# Patient Record
Sex: MALE | Race: WHITE | NOT HISPANIC OR LATINO | Employment: FULL TIME | URBAN - METROPOLITAN AREA
[De-identification: names, ages, dates, MRNs, and addresses within clinical notes are randomized per-mention and may not be internally consistent; named-entity substitution may affect disease eponyms.]

---

## 2017-01-17 ENCOUNTER — COMMUNICATION - HEALTHEAST (OUTPATIENT)
Dept: FAMILY MEDICINE | Facility: CLINIC | Age: 52
End: 2017-01-17

## 2017-05-10 ENCOUNTER — COMMUNICATION - HEALTHEAST (OUTPATIENT)
Dept: FAMILY MEDICINE | Facility: CLINIC | Age: 52
End: 2017-05-10

## 2017-05-10 DIAGNOSIS — E03.9 HYPOTHYROIDISM: ICD-10-CM

## 2017-08-18 ENCOUNTER — COMMUNICATION - HEALTHEAST (OUTPATIENT)
Dept: FAMILY MEDICINE | Facility: CLINIC | Age: 52
End: 2017-08-18

## 2017-08-18 DIAGNOSIS — E03.9 HYPOTHYROIDISM: ICD-10-CM

## 2017-10-19 ENCOUNTER — OFFICE VISIT - HEALTHEAST (OUTPATIENT)
Dept: FAMILY MEDICINE | Facility: CLINIC | Age: 52
End: 2017-10-19

## 2017-10-19 DIAGNOSIS — M16.11 OSTEOARTHRITIS OF RIGHT HIP: ICD-10-CM

## 2017-10-19 DIAGNOSIS — Z01.818 PREOPERATIVE EXAMINATION: ICD-10-CM

## 2017-10-19 DIAGNOSIS — E03.9 HYPOTHYROIDISM: ICD-10-CM

## 2017-10-19 ASSESSMENT — MIFFLIN-ST. JEOR: SCORE: 2090.91

## 2017-11-20 ENCOUNTER — COMMUNICATION - HEALTHEAST (OUTPATIENT)
Dept: FAMILY MEDICINE | Facility: CLINIC | Age: 52
End: 2017-11-20

## 2017-11-20 DIAGNOSIS — E03.9 HYPOTHYROIDISM: ICD-10-CM

## 2017-11-30 ENCOUNTER — OFFICE VISIT - HEALTHEAST (OUTPATIENT)
Dept: FAMILY MEDICINE | Facility: CLINIC | Age: 52
End: 2017-11-30

## 2017-11-30 DIAGNOSIS — M16.11 OSTEOARTHRITIS OF RIGHT HIP: ICD-10-CM

## 2017-11-30 DIAGNOSIS — Z01.818 PREOPERATIVE EXAMINATION: ICD-10-CM

## 2017-11-30 ASSESSMENT — MIFFLIN-ST. JEOR: SCORE: 2098.62

## 2017-12-22 ENCOUNTER — RECORDS - HEALTHEAST (OUTPATIENT)
Dept: ADMINISTRATIVE | Facility: OTHER | Age: 52
End: 2017-12-22

## 2018-01-16 ENCOUNTER — RECORDS - HEALTHEAST (OUTPATIENT)
Dept: ADMINISTRATIVE | Facility: OTHER | Age: 53
End: 2018-01-16

## 2018-10-23 ENCOUNTER — OFFICE VISIT - HEALTHEAST (OUTPATIENT)
Dept: FAMILY MEDICINE | Facility: CLINIC | Age: 53
End: 2018-10-23

## 2018-10-23 DIAGNOSIS — R09.89 CHEST CONGESTION: ICD-10-CM

## 2018-10-23 ASSESSMENT — MIFFLIN-ST. JEOR: SCORE: 2101.69

## 2018-11-10 ENCOUNTER — COMMUNICATION - HEALTHEAST (OUTPATIENT)
Dept: FAMILY MEDICINE | Facility: CLINIC | Age: 53
End: 2018-11-10

## 2018-11-10 DIAGNOSIS — E03.9 HYPOTHYROIDISM: ICD-10-CM

## 2019-06-12 ENCOUNTER — COMMUNICATION - HEALTHEAST (OUTPATIENT)
Dept: FAMILY MEDICINE | Facility: CLINIC | Age: 54
End: 2019-06-12

## 2019-06-25 ENCOUNTER — COMMUNICATION - HEALTHEAST (OUTPATIENT)
Dept: FAMILY MEDICINE | Facility: CLINIC | Age: 54
End: 2019-06-25

## 2019-07-22 ENCOUNTER — OFFICE VISIT - HEALTHEAST (OUTPATIENT)
Dept: FAMILY MEDICINE | Facility: CLINIC | Age: 54
End: 2019-07-22

## 2019-07-22 DIAGNOSIS — Z12.11 SCREEN FOR COLON CANCER: ICD-10-CM

## 2019-07-22 DIAGNOSIS — M16.0 PRIMARY OSTEOARTHRITIS OF BOTH HIPS: ICD-10-CM

## 2019-07-22 DIAGNOSIS — Z12.11 SPECIAL SCREENING FOR MALIGNANT NEOPLASMS, COLON: ICD-10-CM

## 2019-07-22 DIAGNOSIS — E03.9 HYPOTHYROIDISM, UNSPECIFIED TYPE: ICD-10-CM

## 2019-07-22 LAB — TSH SERPL DL<=0.005 MIU/L-ACNC: 1.01 UIU/ML (ref 0.3–5)

## 2019-07-22 ASSESSMENT — MIFFLIN-ST. JEOR: SCORE: 2110.47

## 2019-07-23 ENCOUNTER — COMMUNICATION - HEALTHEAST (OUTPATIENT)
Dept: FAMILY MEDICINE | Facility: CLINIC | Age: 54
End: 2019-07-23

## 2019-08-19 ENCOUNTER — COMMUNICATION - HEALTHEAST (OUTPATIENT)
Dept: FAMILY MEDICINE | Facility: CLINIC | Age: 54
End: 2019-08-19

## 2019-08-19 DIAGNOSIS — E03.9 HYPOTHYROIDISM: ICD-10-CM

## 2020-08-14 ENCOUNTER — COMMUNICATION - HEALTHEAST (OUTPATIENT)
Dept: FAMILY MEDICINE | Facility: CLINIC | Age: 55
End: 2020-08-14

## 2020-08-14 DIAGNOSIS — E03.9 HYPOTHYROIDISM: ICD-10-CM

## 2020-11-23 ENCOUNTER — COMMUNICATION - HEALTHEAST (OUTPATIENT)
Dept: FAMILY MEDICINE | Facility: CLINIC | Age: 55
End: 2020-11-23

## 2020-11-23 DIAGNOSIS — E03.9 HYPOTHYROIDISM: ICD-10-CM

## 2020-12-17 ENCOUNTER — OFFICE VISIT - HEALTHEAST (OUTPATIENT)
Dept: FAMILY MEDICINE | Facility: CLINIC | Age: 55
End: 2020-12-17

## 2020-12-17 ENCOUNTER — COMMUNICATION - HEALTHEAST (OUTPATIENT)
Dept: FAMILY MEDICINE | Facility: CLINIC | Age: 55
End: 2020-12-17

## 2020-12-17 DIAGNOSIS — Z12.11 COLON CANCER SCREENING: ICD-10-CM

## 2020-12-17 DIAGNOSIS — E03.9 HYPOTHYROIDISM: ICD-10-CM

## 2020-12-17 DIAGNOSIS — E78.49 OTHER HYPERLIPIDEMIA: ICD-10-CM

## 2020-12-22 ENCOUNTER — COMMUNICATION - HEALTHEAST (OUTPATIENT)
Dept: LAB | Facility: CLINIC | Age: 55
End: 2020-12-22

## 2020-12-23 ENCOUNTER — AMBULATORY - HEALTHEAST (OUTPATIENT)
Dept: LAB | Facility: CLINIC | Age: 55
End: 2020-12-23

## 2020-12-23 ENCOUNTER — AMBULATORY - HEALTHEAST (OUTPATIENT)
Dept: FAMILY MEDICINE | Facility: CLINIC | Age: 55
End: 2020-12-23

## 2020-12-23 DIAGNOSIS — E78.5 HYPERLIPIDEMIA, UNSPECIFIED HYPERLIPIDEMIA TYPE: ICD-10-CM

## 2020-12-23 DIAGNOSIS — E03.9 HYPOTHYROIDISM, UNSPECIFIED TYPE: ICD-10-CM

## 2020-12-23 LAB
ANION GAP SERPL CALCULATED.3IONS-SCNC: 10 MMOL/L (ref 5–18)
BUN SERPL-MCNC: 17 MG/DL (ref 8–22)
CALCIUM SERPL-MCNC: 9.2 MG/DL (ref 8.5–10.5)
CHLORIDE BLD-SCNC: 106 MMOL/L (ref 98–107)
CHOLEST SERPL-MCNC: 222 MG/DL
CO2 SERPL-SCNC: 24 MMOL/L (ref 22–31)
CREAT SERPL-MCNC: 0.95 MG/DL (ref 0.7–1.3)
ERYTHROCYTE [DISTWIDTH] IN BLOOD BY AUTOMATED COUNT: 11 % (ref 11–14.5)
FASTING STATUS PATIENT QL REPORTED: YES
GFR SERPL CREATININE-BSD FRML MDRD: >60 ML/MIN/1.73M2
GLUCOSE BLD-MCNC: 83 MG/DL (ref 70–125)
HCT VFR BLD AUTO: 39 % (ref 40–54)
HDLC SERPL-MCNC: 71 MG/DL
HGB BLD-MCNC: 13.5 G/DL (ref 14–18)
LDLC SERPL CALC-MCNC: 131 MG/DL
MAGNESIUM SERPL-MCNC: 2 MG/DL (ref 1.8–2.6)
MCH RBC QN AUTO: 32.3 PG (ref 27–34)
MCHC RBC AUTO-ENTMCNC: 34.6 G/DL (ref 32–36)
MCV RBC AUTO: 93 FL (ref 80–100)
PLATELET # BLD AUTO: 246 THOU/UL (ref 140–440)
PMV BLD AUTO: 6.5 FL (ref 7–10)
POTASSIUM BLD-SCNC: 4.1 MMOL/L (ref 3.5–5)
RBC # BLD AUTO: 4.17 MILL/UL (ref 4.4–6.2)
SODIUM SERPL-SCNC: 140 MMOL/L (ref 136–145)
TRIGL SERPL-MCNC: 100 MG/DL
TSH SERPL DL<=0.005 MIU/L-ACNC: 0.38 UIU/ML (ref 0.3–5)
WBC: 5.3 THOU/UL (ref 4–11)

## 2021-02-15 ENCOUNTER — RECORDS - HEALTHEAST (OUTPATIENT)
Dept: ADMINISTRATIVE | Facility: OTHER | Age: 56
End: 2021-02-15

## 2021-05-29 NOTE — TELEPHONE ENCOUNTER
Left message for pt to call back to schedule an appt.  Please assist with scheduling upon return call.

## 2021-05-30 NOTE — PROGRESS NOTES
"Assessment/ Plan     1. Hypothyroidism, unspecified type    Recommend checking a thyroid cascade  Continue levothyroxine  Adjust medicine as needed  - Thyroid [place]    2. Screen for colon cancer  4. Special screening for malignant neoplasms, colon    - Ambulatory referral for Colonoscopy  Recommend a colonoscopy as he is due    3. Primary osteoarthritis of both hips  Status post bilateral total hip arthroplasties    Continue to follow-up with orthopedics as needed        Subjective:       Tomasz Kapadia is a 53 y.o. male who presents for a thyroid check.  He has a known history of hypothyroidism as well as bilateral osteoarthritis of both hips.  He has had previous total hip arthroplasties bilaterally and generally tolerated the procedures well.  He has been able to remain physically active.  He continues to take levothyroxine and is due for a thyroid test.    He reports he generally has been feeling well.  Review of systems is negative for headache, dizziness, chest pain, shortness of breath, palpitations, or other specific concerns.  His mood has been stable.  Has not had any recent hospitalizations or significant illness.       The following portions of the patient's history were reviewed and updated as appropriate: allergies, current medications, past family history, past medical history, past social history, past surgical history and problem list. Medications have been reconciled    Review of Systems   A 12 point comprehensive review of systems was negative except as noted.      Current Outpatient Medications   Medication Sig Dispense Refill     levothyroxine (SYNTHROID, LEVOTHROID) 200 MCG tablet Take 1 tablet (200 mcg total) by mouth once Daily at 6:00 am. 90 tablet 2     No current facility-administered medications for this visit.        Objective:      /88   Pulse 88   Temp 98.6  F (37  C) (Oral)   Resp 16   Ht 6' 1\" (1.854 m)   Wt (!) 269 lb 5 oz (122.2 kg)   BMI 35.53 kg/m        General " appearance: alert, appears stated age and cooperative  Head: Normocephalic, without obvious abnormality, atraumatic  Eyes: conjunctivae/corneas clear. PERRL, EOM's intact.   Nose: Nares normal. Septum midline  Throat: lips, mucosa, and tongue normal; teeth and gums normal  Neck: no adenopathy, supple, symmetrical, trachea midline   Lungs: clear to auscultation bilaterally  Heart: regular rate and rhythm, S1, S2 normal, no murmur, click, rub or gallop  Extremities: extremities normal, atraumatic, no cyanosis or edema  Skin: Skin color, texture, turgor normal. No rashes or lesions  Lymph nodes: Cervical nodes normal.  Neurologic: Alert and oriented X 3         Recent Results (from the past 168 hour(s))   Thyroid Cascade   Result Value Ref Range    TSH 1.01 0.30 - 5.00 uIU/mL          This note has been dictated using voice recognition software. Any grammatical or context distortions are unintentional and inherent to the software

## 2021-05-31 VITALS — WEIGHT: 266.7 LBS | HEIGHT: 73 IN | BODY MASS INDEX: 35.35 KG/M2

## 2021-05-31 VITALS — HEIGHT: 73 IN | WEIGHT: 265 LBS | BODY MASS INDEX: 35.12 KG/M2

## 2021-05-31 NOTE — TELEPHONE ENCOUNTER
Refill Approved    Rx renewed per Medication Renewal Policy. Medication was last renewed on 11/12/2018 for 90/2.  Last OV 7/22/2019  Selene Henry, [facility] Triage/Med Refill 8/20/2019     Requested Prescriptions   Pending Prescriptions Disp Refills     levothyroxine (SYNTHROID, LEVOTHROID) 200 MCG tablet [Pharmacy Med Name: Levothyroxine Sodium Oral Tablet 200 MCG] 90 tablet 1     Sig: Take 1 tablet (200 mcg) by mouth once Daily at 6:00 am       Thyroid Hormones Protocol Passed - 8/19/2019 10:45 AM        Passed - Provider visit in past 12 months or next 3 months     Last office visit with prescriber/PCP: 7/22/2019 Tomasz Sprague MD OR same dept: 7/22/2019 Tomasz Sprague MD OR same specialty: 7/22/2019 Tomasz Sprague MD  Last physical: 11/30/2017 Last MTM visit: Visit date not found   Next visit within 3 mo: Visit date not found  Next physical within 3 mo: Visit date not found  Prescriber OR PCP: Tomasz Sprague MD  Last diagnosis associated with med order: 1. Hypothyroidism  - levothyroxine (SYNTHROID, LEVOTHROID) 200 MCG tablet [Pharmacy Med Name: Levothyroxine Sodium Oral Tablet 200 MCG]; Take 1 tablet (200 mcg) by mouth once Daily at 6:00 am  Dispense: 90 tablet; Refill: 1    If protocol passes may refill for 12 months if within 3 months of last provider visit (or a total of 15 months).             Passed - TSH on file in past 12 months for patient age 12 & older     TSH   Date Value Ref Range Status   07/22/2019 1.01 0.30 - 5.00 uIU/mL Final

## 2021-06-02 VITALS — WEIGHT: 267.38 LBS | BODY MASS INDEX: 35.44 KG/M2 | HEIGHT: 73 IN

## 2021-06-03 VITALS — HEIGHT: 73 IN | BODY MASS INDEX: 35.69 KG/M2 | WEIGHT: 269.31 LBS

## 2021-06-10 NOTE — TELEPHONE ENCOUNTER
RN cannot approve Refill Request    RN can NOT refill this medication Protocol failed and NO refill given. Last office visit: 7/22/2019 Tomasz Sprague MD Last Physical: 11/30/2017 Last MTM visit: Visit date not found Last visit same specialty: 7/22/2019 Tomasz Sprague MD.  Next visit within 3 mo: Visit date not found  Next physical within 3 mo: Visit date not found      Sariah Schaeffer, [facility] Triage/Med Refill 8/14/2020    Requested Prescriptions   Pending Prescriptions Disp Refills     levothyroxine (SYNTHROID, LEVOTHROID) 200 MCG tablet [Pharmacy Med Name: Levothyroxine Sodium Oral Tablet 200 MCG] 90 tablet 0     Sig: Take 1 tablet (200 mcg) by mouth once Daily at 6:00 am       Thyroid Hormones Protocol Failed - 8/14/2020  2:00 AM        Failed - Provider visit in past 12 months or next 3 months     Last office visit with prescriber/PCP: 7/22/2019 Tomasz Sprague MD OR same dept: Visit date not found OR same specialty: 7/22/2019 Tomasz Sprague MD  Last physical: 11/30/2017 Last MTM visit: Visit date not found   Next visit within 3 mo: Visit date not found  Next physical within 3 mo: Visit date not found  Prescriber OR PCP: Tomasz Sprague MD  Last diagnosis associated with med order: 1. Hypothyroidism  - levothyroxine (SYNTHROID, LEVOTHROID) 200 MCG tablet [Pharmacy Med Name: Levothyroxine Sodium Oral Tablet 200 MCG]; Take 1 tablet (200 mcg) by mouth once Daily at 6:00 am  Dispense: 90 tablet; Refill: 0    If protocol passes may refill for 12 months if within 3 months of last provider visit (or a total of 15 months).             Failed - TSH on file in past 12 months for patient age 12 & older     TSH   Date Value Ref Range Status   07/22/2019 1.01 0.30 - 5.00 uIU/mL Final

## 2021-06-13 NOTE — TELEPHONE ENCOUNTER
RN cannot approve Refill Request    RN can NOT refill this medication Protocol failed and NO refill given. Last office visit: 7/22/2019 Tomasz Sprague MD Last Physical: 11/30/2017 Last MTM visit: Visit date not found Last visit same specialty: 7/22/2019 Tomasz Sprague MD.  Next visit within 3 mo: Visit date not found  Next physical within 3 mo: Visit date not found      Sariah Schaeffer, [facility] Triage/Med Refill 11/24/2020    Requested Prescriptions   Pending Prescriptions Disp Refills     levothyroxine (SYNTHROID, LEVOTHROID) 200 MCG tablet [Pharmacy Med Name: Levothyroxine Sodium Oral Tablet 200 MCG] 90 tablet 0     Sig: Take 1 tablet (200 mcg) by mouth once Daily at 6:00 am       Thyroid Hormones Protocol Failed - 11/23/2020  4:34 PM        Failed - Provider visit in past 12 months or next 3 months     Last office visit with prescriber/PCP: 7/22/2019 Tomasz Sprague MD OR same dept: Visit date not found OR same specialty: 7/22/2019 Tomasz Sprague MD  Last physical: 11/30/2017 Last MTM visit: Visit date not found   Next visit within 3 mo: Visit date not found  Next physical within 3 mo: Visit date not found  Prescriber OR PCP: Tomasz Sprague MD  Last diagnosis associated with med order: 1. Hypothyroidism  - levothyroxine (SYNTHROID, LEVOTHROID) 200 MCG tablet [Pharmacy Med Name: Levothyroxine Sodium Oral Tablet 200 MCG]; Take 1 tablet (200 mcg) by mouth once Daily at 6:00 am  Dispense: 90 tablet; Refill: 0    If protocol passes may refill for 12 months if within 3 months of last provider visit (or a total of 15 months).             Failed - TSH on file in past 12 months for patient age 12 & older     TSH   Date Value Ref Range Status   07/22/2019 1.01 0.30 - 5.00 uIU/mL Final

## 2021-06-13 NOTE — PROGRESS NOTES
Assessment/Plan:      Visit for Preoperative Exam.    Right hip osteoarthritis  Hypothyroidism  History of left total hip arthroplasty    Patient approved for surgery with general or local anesthesia. Postoperative pain to be managed by surgeon during post-operative Global Surgical Package timeframe, typically 30-60 days for major surgery, and less for others.   Basic metabolic panel and hemogram with platelets pending  Check a thyroid cascade  Recommend holding NSAIDs at least 5 days prior to surgery  Follow-up as recommended by orthopedic surgery        Subjective:     Scheduled Procedure: Right hip replacement  Surgery Date:  10/31/17  Surgery Location:  [place]  Surgeon:  Dr. Junior    This is a pleasant 52-year-old male who presents to the clinic for preoperative evaluation.  He has a history of end-stage right hip osteoarthritis and is a candidate for right total hip arthroplasty.  He has had a previous left total hip arthroplasty which he tolerated well.  He has had progressive pain in the right hip which has not responded to conservative measures.  He therefore will have surgery.  Past medical history is otherwise notable for hypothyroidism which has been stable on his thyroid medication.  He reports he generally is feeling well.  Other than the hip pain he has not had other concerns.  He has not had a recent fever, infection, chest pain, shortness of breath, or palpitations.  He has known history of hypertension, sleep apnea, or heart disease.  He has not had any problems with anesthesia with his previous surgery.  There is no family history of major anesthesia reactions.      Current Outpatient Prescriptions   Medication Sig Dispense Refill     levothyroxine (SYNTHROID, LEVOTHROID) 200 MCG tablet TAKE 1 TABLET BY MOUTH ONCE DAILY 90 tablet 0     No current facility-administered medications for this visit.        No Known Allergies    Immunization History   Administered Date(s) Administered     Td,  "historic 1965     Tdap 11/01/2011       Patient Active Problem List   Diagnosis     Osteoarthrosis Of The Hip     Hypothyroidism     Lump In / On The Skin     Sebaceous Cyst     Hyperlipidemia       No past medical history on file.    Social History     Social History     Marital status:      Spouse name: N/A     Number of children: N/A     Years of education: N/A     Occupational History     Not on file.     Social History Main Topics     Smoking status: Never Smoker     Smokeless tobacco: Not on file     Alcohol use Not on file     Drug use: Not on file     Sexual activity: Not on file     Other Topics Concern     Not on file     Social History Narrative       No past surgical history on file.    History of Present Illness  Recent Health  Fever: no  Chills: no  Fatigue: no  Chest Pain: no  Cough: no  Dyspnea: no  Urinary Frequency: no  Nausea: no  Vomiting: no  Diarrhea: no  Abdominal Pain: no  Easy Bruising: no  Lower Extremity Swelling: no  Poor Exercise Tolerance: no        Pertinent History    Prior Anesthesia: yes  Previous Anesthesia Reaction:  no  Diabetes: no  Cardiovascular Disease: no  Pulmonary Disease: no  Renal Disease: no  GI Disease: no  Sleep Apnea: no  Thromboembolic Problems: no  Clotting Disorder: no  Bleeding Disorder: no  Transfusion Reaction: n/a  Impaired Immunity: no  Steroid use in the last 6 months: yes, steroid injection  Frequent Aspirin use: no    Family history of no anesthesia reactions    Social history of there are no concerns regarding care after surgery    After surgery, the patient plans to recover at home with family.    Review of Systems  A 12 point comprehensive review of systems was negative except as noted.          Objective:         Vitals:    10/19/17 1406   BP: 132/70   Pulse: 76   Resp: 20   Temp: 97.8  F (36.6  C)   TempSrc: Oral   Weight: (!) 265 lb (120.2 kg)   Height: 6' 1\" (1.854 m)       Physical Exam:    General Appearance: Alert, cooperative, no " distress, appears stated age  Head: Normocephalic, without obvious abnormality, atraumatic  Eyes: PERRL, conjunctiva/corneas clear, EOM's intact  Ears: Normal TM's and external ear canals, both ears  Nose: Nares normal, septum midline,mucosa normal, no drainage  Throat: Lips, mucosa, and tongue normal; teeth and gums normal  Neck: Supple, symmetrical, trachea midline, no adenopathy;  thyroid: not enlarged, symmetric  Lungs: Clear to auscultation bilaterally, respirations unlabored  Heart: Regular rate and rhythm, S1 and S2 normal, no murmur, rub, or gallop,  Abdomen: Soft, non-tender  Extremities: Extremities normal, atraumatic, no cyanosis or edema  Skin: Skin color, texture, turgor normal, no rashes or lesions  Neurologic: He is alert. He has normal reflexes.   Psychiatric: He has a normal mood and affect.   Cranial nerves II to XII are intact

## 2021-06-14 NOTE — TELEPHONE ENCOUNTER
Patient coming in 12/23/2020 for fasting labs. You have a thyroid order placed per his recent VV on 12/17/2020. I don't see an record of fasting labs in his chart. Are you willing to authorize any other orders without an appt? If so, please enter orders, thanks!

## 2021-06-14 NOTE — PROGRESS NOTES
Assessment/Plan:      Visit for Preoperative Exam.    Right hip osteoarthritis  Hypothyroidism  History of left total hip arthroplasty  Isolated elevated blood pressure, situational. Secondary to hip pain    Patient approved for surgery with general or local anesthesia. Postoperative pain to be managed by surgeon during post-operative Global Surgical Package timeframe, typically 30-60 days for major surgery, and less for others.   Basic metabolic panel and hemogram with platelets were checked  Sodium 143 with a potassium of 4.7  GFR > 60  Hemoglobin 13.1  Monitor blood pressure  Recommend holding NSAIDs at least 5 days prior to surgery  Follow-up as recommended by orthopedic surgery       Subjective:     Scheduled Procedure: R Hip  Surgery Date:  12/5/17  Surgery Location:  [place]  Surgeon:  Dr Wharton    This is a pleasant 52-year-old male who presents to the clinic for preoperative evaluation.  He has a history of end-stage right hip osteoarthritis and is a candidate for right total hip arthroplasty.  He has had a previous left total hip arthroplasty which he tolerated well.  He has had progressive pain in the right hip which has not responded to conservative measures.  He therefore will have surgery.  Past medical history is otherwise notable for hypothyroidism which has been stable on his thyroid medication.  He reports he generally is feeling well.  Other than the hip pain he has not had other concerns.  He has not had a recent fever, infection, chest pain, shortness of breath, or palpitations.  He has known history of hypertension, sleep apnea, or heart disease.  He has not had any problems with anesthesia with his previous surgery.  There is no family history of major anesthesia reactions. It should be noted that his last surgery was delayed as he was required to have more physical therapy and that has not been helpful.  He continues to have significant right hip pain and is a candidate for surgery. He is  able to perform at least 4 METS of physical activity without difficulty.       Current Outpatient Prescriptions   Medication Sig Dispense Refill     levothyroxine (SYNTHROID, LEVOTHROID) 200 MCG tablet Take 1 tablet (200 mcg total) by mouth Daily at 6:00 am. 90 tablet 3     No current facility-administered medications for this visit.        No Known Allergies    Immunization History   Administered Date(s) Administered     Td,adult,historic,unspecified 1965     Tdap 11/01/2011       Patient Active Problem List   Diagnosis     Osteoarthrosis Of The Hip     Hypothyroidism     Lump In / On The Skin     Sebaceous Cyst     Hyperlipidemia       No past medical history on file.    Social History     Social History     Marital status:      Spouse name: N/A     Number of children: N/A     Years of education: N/A     Occupational History     Not on file.     Social History Main Topics     Smoking status: Never Smoker     Smokeless tobacco: Not on file     Alcohol use Not on file     Drug use: Not on file     Sexual activity: Not on file     Other Topics Concern     Not on file     Social History Narrative       No past surgical history on file.    History of Present Illness  Recent Health  Fever: no  Chills: no  Fatigue: no  Chest Pain: no  Cough: no  Dyspnea: no  Urinary Frequency: no  Nausea: no  Vomiting: no  Diarrhea: no  Abdominal Pain: no  Easy Bruising: no  Lower Extremity Swelling: no  Poor Exercise Tolerance: no            Pertinent History     Prior Anesthesia: yes  Previous Anesthesia Reaction:  no  Diabetes: no  Cardiovascular Disease: no  Pulmonary Disease: no  Renal Disease: no  GI Disease: no  Sleep Apnea: no  Thromboembolic Problems: no  Clotting Disorder: no  Bleeding Disorder: no  Transfusion Reaction: n/a  Impaired Immunity: no  Steroid use in the last 6 months: yes, steroid injection  Frequent Aspirin use: no     Family history of no anesthesia reactions     Social history of there are no  "concerns regarding care after surgery     After surgery, the patient plans to recover at home with family.     Review of Systems  A 12 point comprehensive review of systems was negative except as noted.            Objective:              Vitals:     10/19/17 1406   BP: 132/70   Pulse: 76   Resp: 20   Temp: 97.8  F (36.6  C)   TempSrc: Oral   Weight: (!) 265 lb (120.2 kg)   Height: 6' 1\" (1.854 m)         Physical Exam:     General Appearance: Alert, cooperative, no distress, appears stated age  Head: Normocephalic, without obvious abnormality, atraumatic  Eyes: PERRL, conjunctiva/corneas clear, EOM's intact  Ears: Normal TM's and external ear canals, both ears  Nose: Nares normal, septum midline,mucosa normal, no drainage  Throat: Lips, mucosa, and tongue normal; teeth and gums normal  Neck: Supple, symmetrical, trachea midline, no adenopathy;  thyroid: not enlarged, symmetric  Lungs: Clear to auscultation bilaterally, respirations unlabored  Heart: Regular rate and rhythm, S1 and S2 normal, no murmur, rub, or gallop,  Abdomen: Soft, non-tender  Extremities: Extremities normal, atraumatic, no cyanosis or edema  Skin: Skin color, texture, turgor normal, no rashes or lesions  Neurologic: He is alert. He has normal reflexes.   Psychiatric: He has a normal mood and affect.   Cranial nerves II to XII are intact          "

## 2021-06-16 ENCOUNTER — COMMUNICATION - HEALTHEAST (OUTPATIENT)
Dept: FAMILY MEDICINE | Facility: CLINIC | Age: 56
End: 2021-06-16

## 2021-06-16 DIAGNOSIS — E03.9 HYPOTHYROIDISM: ICD-10-CM

## 2021-06-16 PROBLEM — D36.9 TUBULAR ADENOMA: Status: ACTIVE | Noted: 2021-02-18

## 2021-06-16 PROBLEM — E66.01 MORBID OBESITY (H): Status: ACTIVE | Noted: 2018-10-23

## 2021-06-16 PROBLEM — E78.49 OTHER HYPERLIPIDEMIA: Status: ACTIVE | Noted: 2020-12-27

## 2021-06-16 RX ORDER — LEVOTHYROXINE SODIUM 200 UG/1
200 TABLET ORAL DAILY
Qty: 90 TABLET | Refills: 2 | Status: SHIPPED | OUTPATIENT
Start: 2021-06-16 | End: 2022-03-18

## 2021-06-19 NOTE — LETTER
Letter by Tomasz Sprague MD at      Author: Tomasz Sprague MD Service: -- Author Type: --    Filed:  Encounter Date: 6/25/2019 Status: (Other)       Tomasz Kapadia  [address] [facility] [address]             June 25, 2019        Dear Tomasz Kapadia :    Dr. Sprague was reviewing your chart and noticed that you are due for a colonoscopy.      To prevent delays in your care, please call [facility] [phone] to schedule a screening colonoscopy.    If you had a colonoscopy performed within the last 10 years at a different facility please contact the [facility] at [phone] so we can get the report.    Sincerely,  Your care team at [facility]

## 2021-06-19 NOTE — LETTER
Letter by Tomasz Sprague MD at      Author: Tomasz Sprague MD Service: -- Author Type: --    Filed:  Encounter Date: 7/23/2019 Status: (Other)         Tomasz TAINA Kapadia  [address] [facility] [address]             July 23, 2019         Dear Mr. Kapadia,    Below are the results from your recent visit:    Resulted Orders   Thyroid Cascade   Result Value Ref Range    TSH 1.01 0.30 - 5.00 uIU/mL       Your thyroid test was normal. Very good.     Please call with questions or contact us using [facility].    Sincerely,        Electronically signed by Tomasz Sprague MD

## 2021-06-21 NOTE — PROGRESS NOTES
"On license of [facility] Clinic Note    Tomasz Kapadia  1965   [id number]    Tomasz Kapadia is a 53 y.o. male with significant past medical history of OA s/p bilateral hip replacements, hypothyroidism, obesity, and HLP presenting to discuss the following:     CC:   Chief Complaint   Patient presents with     Fever     Low grade     Cough     Chest congestion       HPI:    COUGH / FEVER / CONGESTION-   - Symptom onset 2 weeks ago. Developed \"crud\", right to the chest.   - Symptoms are lingering, had symptoms in ears and back of head.   - Denies nasal congestion or sore throat.   - Productive cough  - One day, was difficult to breathe after coughing, hard to clear airway, muscles relaxed enough and finally released.   - not sleeping well secondary to cough   - has tried mucinex, helped about 2 hours    HEALTH MAINTENANCE -   - declines colonoscopy right now, work is busy, will plan for screening after new year  - gets flu shots for free at work, will do there     ROS:   CONSTITUTIONAL: low grade temps, poor sleep causing fatigue, appetite is good, weight stable  HEENT: see above   HEART: no chest pain  LUNGS: see above. Intermittent wheezing (improving)  ABDOMEN: no abdominal pain, no nausea  MSK: no muscle aches    PMH:   Patient Active Problem List   Diagnosis     Osteoarthrosis Of The Hip     Hypothyroidism     Lump In / On The Skin     Sebaceous Cyst     Hyperlipidemia       No past medical history on file.    PSH:   Past Surgical History:   Procedure Laterality Date     TOTAL HIP ARTHROPLASTY Left 02/2013    [facility]     TOTAL HIP ARTHROPLASTY Right 01/2018    [facility]     MEDICATIONS:   Current Outpatient Prescriptions on File Prior to Visit   Medication Sig Dispense Refill     levothyroxine (SYNTHROID, LEVOTHROID) 200 MCG tablet Take 1 tablet (200 mcg total) by mouth Daily at 6:00 am. 90 tablet 3     No current facility-administered medications on file prior to visit.  " "      ALLERGIES:  No Known Allergies    FAMHx:  Family History   Problem Relation Age of Onset     No Medical Problems Mother      No Medical Problems Father      Hypothyroidism Sister      Leukemia Sister      Diabetes Brother      Type 1 diabetes     Hypothyroidism Brother      No Medical Problems Brother      No Medical Problems Brother      No Medical Problems Sister      No family history of lung cancer.       SOCIAL HISTORY:   Social History     Social History     Marital status:      Spouse name: N/A     Number of children: N/A     Years of education: N/A     Occupational History     Not on file.     Social History Main Topics     Smoking status: Never Smoker     Smokeless tobacco: Not on file     Alcohol use Not on file     Drug use: Not on file     Sexual activity: Not on file     Other Topics Concern     Not on file     Social History Narrative     Dad smoked as child - passive smoke exposure growing up.     PHYSICAL EXAM:   /76  Pulse 75  Temp 98.2  F (36.8  C) (Oral)   Resp 20  Ht 6' 1\" (1.854 m)  Wt (!) 267 lb 6 oz (121.3 kg)  SpO2 98%  BMI 35.28 kg/m2   GENERAL: Tomasz is a pleasant obese male, appears stated age, nontoxic.   HEENT: Sclera white, no nasal discharge present, bilateral tympanic membranes are dull but without retraction, bulging, or erythema. No cervical lymphadenopathy.   HEART: Regular rate and rhythm, no murmurs.   LUNGS: Clear to auscultation bilaterally, unlabored. No crackles, no wheezing.     ASSESSMENT & PLAN:     Tomasz Kapadia is a 53 y.o. male presenting for evaluation of acute illness.     1. Chest congestion  Symptoms consistent with viral bronchitis. He is afebrile with normal respiratory rate, normal oxygenation, no shortness of breath, and no fatigue. Symptoms and exam are not consistent with pneumonia. Chest x-ray not indicated at this time.   Discussed symptomatic management with expectorants, cough suppressants, and antipyretics as needed. "   Instructed to return for further evaluation if worsening of symptoms or symptoms persistent x 2 more weeks.     HM: Declines flu shot or scheduling colonoscopy today. Plans to do both in near future.     RTC: Due for thyroid labs in 1 month. Would like to defer to new year if possible.     Kelli Hawthorne, DO

## 2021-06-25 NOTE — TELEPHONE ENCOUNTER
Refill Approved    Rx renewed per Medication Renewal Policy. Medication was last renewed on 12/17/20.    Leo Veronica, [facility] Triage/Med Refill 6/16/2021     Requested Prescriptions   Pending Prescriptions Disp Refills     levothyroxine (SYNTHROID, LEVOTHROID) 200 MCG tablet [Pharmacy Med Name: Levothyroxine Sodium Oral Tablet 200 MCG] 90 tablet 0     Sig: Take 1 tablet (200 mcg total) by mouth Daily at 6:00 am.       Thyroid Hormones Protocol Passed - 6/16/2021  2:00 AM        Passed - Provider visit in past 12 months or next 3 months     Last office visit with prescriber/PCP: 7/22/2019 Tomasz Sprague MD OR same dept: Visit date not found OR same specialty: 7/22/2019 Tomasz Sprague MD  Last physical: 11/30/2017 Last MTM visit: Visit date not found   Next visit within 3 mo: Visit date not found  Next physical within 3 mo: Visit date not found  Prescriber OR PCP: Tomasz Sprague MD  Last diagnosis associated with med order: 1. Hypothyroidism  - levothyroxine (SYNTHROID, LEVOTHROID) 200 MCG tablet [Pharmacy Med Name: Levothyroxine Sodium Oral Tablet 200 MCG]; Take 1 tablet (200 mcg total) by mouth Daily at 6:00 am.  Dispense: 90 tablet; Refill: 0    If protocol passes may refill for 12 months if within 3 months of last provider visit (or a total of 15 months).             Passed - TSH on file in past 12 months for patient age 12 & older     TSH   Date Value Ref Range Status   12/23/2020 0.38 0.30 - 5.00 uIU/mL Final

## 2022-03-18 DIAGNOSIS — E03.9 HYPOTHYROIDISM: ICD-10-CM

## 2022-03-18 RX ORDER — LEVOTHYROXINE SODIUM 200 UG/1
TABLET ORAL
Qty: 30 TABLET | Refills: 0 | Status: SHIPPED | OUTPATIENT
Start: 2022-03-18 | End: 2022-04-05

## 2022-03-18 NOTE — TELEPHONE ENCOUNTER
"Routing refill request to provider for review/approval because:  Labs not current:  TSH  Patient needs to be seen because it has been more than 1 year since last office visit.    Last Written Prescription Date:  6/16/21  Last Fill Quantity: 90,  # refills: 2   Last office visit provider:  12/17/20     Requested Prescriptions   Pending Prescriptions Disp Refills     levothyroxine (SYNTHROID/LEVOTHROID) 200 MCG tablet [Pharmacy Med Name: Levothyroxine Sodium Oral Tablet 200 MCG] 90 tablet 0     Sig: Take 1 tablet (200 mcg) by mouth Daily at 6:00 am.       Thyroid Protocol Failed - 3/18/2022 11:33 AM        Failed - Recent (12 mo) or future (30 days) visit within the authorizing provider's specialty     Patient has had an office visit with the authorizing provider or a provider within the authorizing providers department within the previous 12 mos or has a future within next 30 days. See \"Patient Info\" tab in inbasket, or \"Choose Columns\" in Meds & Orders section of the refill encounter.              Failed - Normal TSH on file in past 12 months     Recent Labs   Lab Test 12/23/20  1524   TSH 0.38              Passed - Patient is 12 years or older        Passed - Medication is active on med list             Leo Veronica RN 03/18/22 11:33 AM  "

## 2022-03-18 NOTE — TELEPHONE ENCOUNTER
Please call patient.  There is a refill request for his thyroid medication.  I only sent a 30-day supply as he has not been seen since 2020.  He needs an in person visit with me and will need laboratory testing then.

## 2022-04-05 ENCOUNTER — OFFICE VISIT (OUTPATIENT)
Dept: FAMILY MEDICINE | Facility: CLINIC | Age: 57
End: 2022-04-05
Payer: COMMERCIAL

## 2022-04-05 VITALS
BODY MASS INDEX: 35.62 KG/M2 | WEIGHT: 270 LBS | RESPIRATION RATE: 16 BRPM | SYSTOLIC BLOOD PRESSURE: 177 MMHG | DIASTOLIC BLOOD PRESSURE: 84 MMHG | HEART RATE: 69 BPM

## 2022-04-05 DIAGNOSIS — E78.49 OTHER HYPERLIPIDEMIA: ICD-10-CM

## 2022-04-05 DIAGNOSIS — E03.9 HYPOTHYROIDISM, UNSPECIFIED TYPE: Primary | ICD-10-CM

## 2022-04-05 DIAGNOSIS — D12.6 ADENOMATOUS POLYP OF COLON, UNSPECIFIED PART OF COLON: ICD-10-CM

## 2022-04-05 DIAGNOSIS — R03.0 ELEVATED BLOOD PRESSURE READING WITHOUT DIAGNOSIS OF HYPERTENSION: ICD-10-CM

## 2022-04-05 PROCEDURE — 99214 OFFICE O/P EST MOD 30 MIN: CPT | Performed by: FAMILY MEDICINE

## 2022-04-05 RX ORDER — LEVOTHYROXINE SODIUM 200 UG/1
TABLET ORAL
Qty: 90 TABLET | Refills: 3 | Status: SHIPPED | OUTPATIENT
Start: 2022-04-05 | End: 2023-04-05

## 2022-04-05 NOTE — PROGRESS NOTES
Assessment & Plan     Hypothyroidism, unspecified type    Continue levothyroxine  Recommend follow-up to check a thyroid cascade in the near future    - levothyroxine (SYNTHROID/LEVOTHROID) 200 MCG tablet  Dispense: 90 tablet; Refill: 3    Other hyperlipidemia    Reviewed his previous elevated cholesterol readings  Continue to work on diet and exercise as he is able  Follow-up to recheck his cholesterol and calculate the 10-year cardiovascular risk    Elevated blood pressure reading without diagnosis of hypertension    His blood pressure is elevated today  Review potential risks  Recommend working on diet and weight loss  Recommend limiting sodium and also limit carbohydrates  Follow-up to recheck blood pressure  Consider antihypertensive medication if needed    History of adenomatous colon polyps    His most recent colonoscopy was in February of 2021.  He is due in February of 2020    See Patient Instructions    No follow-ups on file.    Tomasz Sprague MD  [facility]    Ifeanyi Tolbert is a 56 year old who presents to the clinic in follow-up for a medication check.  He has a known history of hypothyroidism, hypercholesterolemia, and bilateral hip osteoarthritis.    His last visit was a virtual visit in 2020 and he is due for laboratory testing today.  At his last check his cholesterol was 222 with triglycerides of 100, and HDL of 71, and LDL of 131.  His hemoglobin was 13.5.  His TSH was normal at 0.38.    As noted, he has had previous total hip arthroplasties bilaterally and as generally tolerated this well.    Overall, he has felt generally well.  Is not had problems with chest pain, shortness of breath, or palpitations.  His weight continues to be a problem.  Is challenging for him to lose weight.    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    In reviewing the chart he has a history of an adenomatous colon polyp.  His most recent colonoscopy was in February  of 2021.  He is due in February of 2028.      History of Present Illness       Hypothyroidism:     Since last visit, patient describes the following symptoms::  None    He eats 0-1 servings of fruits and vegetables daily.He consumes 1 sweetened beverage(s) daily.He exercises with enough effort to increase his heart rate 60 or more minutes per day.  He exercises with enough effort to increase his heart rate 6 days per week.   He is taking medications regularly.       Review of Systems     Objective    Resp 16   Wt 122.5 kg (270 lb)   BMI 35.62 kg/m    Body mass index is 35.62 kg/m .  Physical Exam   GENERAL: healthy, alert and no distress  EYES: Eyes grossly normal to inspection, PERRL and conjunctivae and sclerae normal  NECK: no adenopathy, no asymmetry, masses, or scars and thyroid normal to palpation  RESP: lungs clear to auscultation - no rales, rhonchi or wheezes  CV: regular rate and rhythm, normal S1 S2, no S3 or S4, no murmur, click or rub, no peripheral edema and peripheral pulses strong  MS: no gross musculoskeletal defects noted, no edema  SKIN: no suspicious lesions or rashes  PSYCH: mentation appears normal, affect normal/bright

## 2022-04-05 NOTE — PATIENT INSTRUCTIONS
Eitan,    Remain physically active as you are able  Work on weight loss as you are able  I recommend that you limit carbohydrates  Your blood pressure is elevated.  Normal blood pressure is less than 120/80  You may want to consider a home blood pressure cuff  Consider vaccines including the Shingrix/shingles vaccine and a tetanus booster  Great job with getting your colonoscopy.  You will be due again in 2028  Please let me know if you need anything  Levothyroxine has been sent to your pharmacy    Tomasz Sprague MD

## 2023-05-06 DIAGNOSIS — E03.9 HYPOTHYROIDISM, UNSPECIFIED TYPE: ICD-10-CM

## 2023-05-06 NOTE — TELEPHONE ENCOUNTER
"Routing refill request to provider for review/approval because:  Rosario given x1 and patient did not follow up, please advise  Labs not current:  Last TSH 12/23/20  Patient needs to be seen because it has been more than 1 year since last office visit.    Last Written Prescription Date:  4/5/213  Last Fill Quantity: 30,  # refills: 0   Last office visit provider:  4/5/22     Requested Prescriptions   Pending Prescriptions Disp Refills     levothyroxine (SYNTHROID/LEVOTHROID) 200 MCG tablet [Pharmacy Med Name: Levothyroxine Sodium Oral Tablet 200 MCG] 30 tablet 0     Sig: Take 1 tablet  by mouth once Daily at 6:00 am.   ( due for appt)       Thyroid Protocol Failed - 5/6/2023  2:00 AM        Failed - Recent (12 mo) or future (30 days) visit within the authorizing provider's specialty     Patient has had an office visit with the authorizing provider or a provider within the authorizing providers department within the previous 12 mos or has a future within next 30 days. See \"Patient Info\" tab in inbasket, or \"Choose Columns\" in Meds & Orders section of the refill encounter.              Failed - Normal TSH on file in past 12 months     Recent Labs   Lab Test 12/23/20  1524   TSH 0.38              Passed - Patient is 12 years or older        Passed - Medication is active on med list             ERNIE BALES RN 05/06/23 4:08 PM  "

## 2023-05-08 RX ORDER — LEVOTHYROXINE SODIUM 200 UG/1
TABLET ORAL
Qty: 30 TABLET | Refills: 0 | Status: SHIPPED | OUTPATIENT
Start: 2023-05-08 | End: 2023-06-21

## 2023-05-08 NOTE — TELEPHONE ENCOUNTER
Please call patient.  He was last seen a year ago but has not had laboratory testing for his thyroid since 2020.  I refilled his medication but he needs to make an appointment with me.  We will check laboratory testing then. Thank you.

## 2023-06-21 ENCOUNTER — TRANSFERRED RECORDS (OUTPATIENT)
Dept: HEALTH INFORMATION MANAGEMENT | Facility: CLINIC | Age: 58
End: 2023-06-21
Payer: COMMERCIAL

## 2023-06-21 DIAGNOSIS — E03.9 HYPOTHYROIDISM, UNSPECIFIED TYPE: ICD-10-CM

## 2023-06-21 RX ORDER — LEVOTHYROXINE SODIUM 200 UG/1
TABLET ORAL
Qty: 30 TABLET | Refills: 0 | Status: SHIPPED | OUTPATIENT
Start: 2023-06-21 | End: 2023-06-28

## 2023-06-21 NOTE — TELEPHONE ENCOUNTER
Patient is down to four pills and has an appointment coming up with you next week on the 28th. Please call in short refill so he does not run fully out before he comes in next week.

## 2023-06-28 ENCOUNTER — OFFICE VISIT (OUTPATIENT)
Dept: FAMILY MEDICINE | Facility: CLINIC | Age: 58
End: 2023-06-28
Payer: COMMERCIAL

## 2023-06-28 VITALS
SYSTOLIC BLOOD PRESSURE: 176 MMHG | OXYGEN SATURATION: 97 % | HEIGHT: 73 IN | DIASTOLIC BLOOD PRESSURE: 87 MMHG | WEIGHT: 271.2 LBS | BODY MASS INDEX: 35.94 KG/M2 | HEART RATE: 61 BPM | TEMPERATURE: 98.8 F | RESPIRATION RATE: 16 BRPM

## 2023-06-28 DIAGNOSIS — G89.29 CHRONIC PAIN OF RIGHT KNEE: ICD-10-CM

## 2023-06-28 DIAGNOSIS — M25.561 CHRONIC PAIN OF RIGHT KNEE: ICD-10-CM

## 2023-06-28 DIAGNOSIS — E78.49 OTHER HYPERLIPIDEMIA: ICD-10-CM

## 2023-06-28 DIAGNOSIS — R03.0 ELEVATED BLOOD PRESSURE READING WITHOUT DIAGNOSIS OF HYPERTENSION: ICD-10-CM

## 2023-06-28 DIAGNOSIS — E03.9 HYPOTHYROIDISM, UNSPECIFIED TYPE: Primary | ICD-10-CM

## 2023-06-28 LAB
ANION GAP SERPL CALCULATED.3IONS-SCNC: 11 MMOL/L (ref 7–15)
BUN SERPL-MCNC: 14.6 MG/DL (ref 6–20)
CALCIUM SERPL-MCNC: 9.7 MG/DL (ref 8.6–10)
CHLORIDE SERPL-SCNC: 101 MMOL/L (ref 98–107)
CREAT SERPL-MCNC: 0.94 MG/DL (ref 0.67–1.17)
DEPRECATED HCO3 PLAS-SCNC: 25 MMOL/L (ref 22–29)
GFR SERPL CREATININE-BSD FRML MDRD: >90 ML/MIN/1.73M2
GLUCOSE SERPL-MCNC: 99 MG/DL (ref 70–99)
LDLC SERPL DIRECT ASSAY-MCNC: 134 MG/DL
POTASSIUM SERPL-SCNC: 4.9 MMOL/L (ref 3.4–5.3)
SODIUM SERPL-SCNC: 137 MMOL/L (ref 136–145)
TSH SERPL DL<=0.005 MIU/L-ACNC: 1.15 UIU/ML (ref 0.3–4.2)

## 2023-06-28 PROCEDURE — 99214 OFFICE O/P EST MOD 30 MIN: CPT | Performed by: FAMILY MEDICINE

## 2023-06-28 PROCEDURE — 84443 ASSAY THYROID STIM HORMONE: CPT | Performed by: FAMILY MEDICINE

## 2023-06-28 PROCEDURE — 83721 ASSAY OF BLOOD LIPOPROTEIN: CPT | Performed by: FAMILY MEDICINE

## 2023-06-28 PROCEDURE — 80048 BASIC METABOLIC PNL TOTAL CA: CPT | Performed by: FAMILY MEDICINE

## 2023-06-28 PROCEDURE — 36415 COLL VENOUS BLD VENIPUNCTURE: CPT | Performed by: FAMILY MEDICINE

## 2023-06-28 RX ORDER — LEVOTHYROXINE SODIUM 200 UG/1
TABLET ORAL
Qty: 90 TABLET | Refills: 3 | Status: SHIPPED | OUTPATIENT
Start: 2023-06-28 | End: 2024-07-12

## 2023-06-28 NOTE — PATIENT INSTRUCTIONS
Eitan,    I sent a refill of your thyroid medication  Work on your diet and exercise as you are able  Limit carbohydrates such as starches, pastas, and blood sugars  You can consider getting a blood pressure device  We can recheck at your pre-op    Tomasz Sprague MD

## 2023-06-28 NOTE — LETTER
June 29, 2023      Tomasz Kapadia  [address] [id number]        Dear Eitan,    Here is a copy of your results.    Your kidney profile is normal.    Your thyroid test is normal.    Your cholesterol is elevated. Work to improve your diet and exercise as you are able. Here are some tips:      Consume a diet that encourages vegetables, fruits, and whole grains  Dairy products should be low fat  Eat more poultry, fish, beans and nuts as a primary protein source  Limit sweets, sugar-sweetened beverages, and red meats  Use healthy oils like olive oil or canola oil for cooking  Limit high fat foods  Drink more water  Limit salt in your diet  Get plenty of aerobic physical activity    We can recheck fasting labs in the future.      Please let me know if you have questions or need additional resources.    Tomasz Sprague MD            Resulted Orders   Basic metabolic panel   Result Value Ref Range    Sodium 137 136 - 145 mmol/L    Potassium 4.9 3.4 - 5.3 mmol/L    Chloride 101 98 - 107 mmol/L    Carbon Dioxide (CO2) 25 22 - 29 mmol/L    Anion Gap 11 7 - 15 mmol/L    Urea Nitrogen 14.6 6.0 - 20.0 mg/dL    Creatinine 0.94 0.67 - 1.17 mg/dL    Calcium 9.7 8.6 - 10.0 mg/dL    Glucose 99 70 - 99 mg/dL    GFR Estimate >90 >60 mL/min/1.73m2   TSH with free T4 reflex   Result Value Ref Range    TSH 1.15 0.30 - 4.20 uIU/mL   LDL cholesterol direct   Result Value Ref Range    LDL Cholesterol Direct 134 (H) <100 mg/dL      Comment:      Age 2-19 years:  Desirable: 0-110 mg/dL   Borderline high: 110-129 mg/dL   High: >= 130 mg/dL    Age 20 years and older:  Desirable: <100mg/dL  Above desirable: 100-129 mg/dL   Borderline high: 130-159 mg/dL   High: 160-189 mg/dL   Very high: >= 190 mg/dL       If you have any questions or concerns, please call the clinic at the number listed above.       Sincerely,      Tomasz Sprague MD

## 2023-06-28 NOTE — PROGRESS NOTES
"  Assessment & Plan     Hypothyroidism, unspecified type    Check a thyroid cascade  Continue levothyroxine  Refills sent to his pharmacy    - levothyroxine (SYNTHROID/LEVOTHROID) 200 MCG tablet  Dispense: 90 tablet; Refill: 3  - TSH with free T4 reflex  - TSH with free T4 reflex    Elevated blood pressure reading without diagnosis of hypertension    Reviewed his elevated blood pressure readings.  He was quite elevated at the last visit  Reviewed dietary and lifestyle changes  Encourage weight loss  Reviewed options including starting a medication.  His preference is to follow-up and recheck at the next visit    - Basic metabolic panel  - Basic metabolic panel    Other hyperlipidemia    Check an LDL cholesterol  - LDL cholesterol direct  - LDL cholesterol direct    Chronic pain of right knee    Reportedly has a meniscus tear and will have an upcoming surgery including an arthroscopy               BMI:   Estimated body mass index is 35.94 kg/m  as calculated from the following:    Height as of this encounter: 1.85 m (6' 0.84\").    Weight as of this encounter: 123 kg (271 lb 3.2 oz).           Tomasz Sprague MD  [facility]    Ifeanyi Tolbert is a 57 year old, presenting for the following health issues:  Follow Up (Follow up on thyroid)    This is a 57-year-old male who presents to clinic for medication check.  He was last seen in April 2022.    He has a known history of hypothyroidism, hypercholesterolemia, and bilateral hip osteoarthritis.    He continues to take levothyroxine and is due for laboratory testing.  He has been consistent in taking his medications.    He has had ongoing joint concerns.  He has arthritis and a possible meniscus tear in his right knee and will have an upcoming surgery in August of this year.  Per orthopedics this will be an arthroscopy.  He has had previous bilateral total hip arthroplasties.    His blood pressure was significantly elevated at the " "last visit.  He does not check his blood pressure at home.    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              6/28/2023     8:42 AM   Additional Questions   Roomed by Nikcy MACDONALD CMA     History of Present Illness       Hypothyroidism:     Since last visit, patient describes the following symptoms::  None    He eats 0-1 servings of fruits and vegetables daily.He consumes 1 sweetened beverage(s) daily.He exercises with enough effort to increase his heart rate 60 or more minutes per day.  He exercises with enough effort to increase his heart rate 7 days per week.   He is taking medications regularly.               Review of Systems         Objective    BP (!) 176/87 (BP Location: Left arm, Patient Position: Sitting, Cuff Size: Adult Large)   Pulse 61   Temp 98.8  F (37.1  C) (Oral)   Resp 16   Ht 1.85 m (6' 0.84\")   Wt 123 kg (271 lb 3.2 oz)   SpO2 97%   BMI 35.94 kg/m    Body mass index is 35.94 kg/m .  Physical Exam   GENERAL: healthy, alert and no distress  EYES: Eyes grossly normal to inspection, PERRL and conjunctivae and sclerae normal  RESP: lungs clear to auscultation - no rales, rhonchi or wheezes  CV: regular rate and rhythm, normal S1 S2, no S3 or S4, no murmur, click or rub, no peripheral edema and peripheral pulses strong  PSYCH: mentation appears normal, affect normal/bright                    "

## 2023-08-01 ENCOUNTER — OFFICE VISIT (OUTPATIENT)
Dept: FAMILY MEDICINE | Facility: CLINIC | Age: 58
End: 2023-08-01
Payer: COMMERCIAL

## 2023-08-01 VITALS
BODY MASS INDEX: 35.25 KG/M2 | TEMPERATURE: 98.3 F | SYSTOLIC BLOOD PRESSURE: 154 MMHG | HEART RATE: 63 BPM | HEIGHT: 73 IN | OXYGEN SATURATION: 100 % | WEIGHT: 266 LBS | RESPIRATION RATE: 16 BRPM | DIASTOLIC BLOOD PRESSURE: 83 MMHG

## 2023-08-01 DIAGNOSIS — Z01.818 PREOPERATIVE EXAMINATION: Primary | ICD-10-CM

## 2023-08-01 DIAGNOSIS — E03.9 HYPOTHYROIDISM, UNSPECIFIED TYPE: ICD-10-CM

## 2023-08-01 DIAGNOSIS — S83.206D TEAR OF MENISCUS OF RIGHT KNEE AS CURRENT INJURY, UNSPECIFIED MENISCUS, UNSPECIFIED TEAR TYPE, SUBSEQUENT ENCOUNTER: ICD-10-CM

## 2023-08-01 LAB
ERYTHROCYTE [DISTWIDTH] IN BLOOD BY AUTOMATED COUNT: 11.9 % (ref 10–15)
HCT VFR BLD AUTO: 42.2 % (ref 40–53)
HGB BLD-MCNC: 14.2 G/DL (ref 13.3–17.7)
MCH RBC QN AUTO: 31.6 PG (ref 26.5–33)
MCHC RBC AUTO-ENTMCNC: 33.6 G/DL (ref 31.5–36.5)
MCV RBC AUTO: 94 FL (ref 78–100)
PLATELET # BLD AUTO: 235 10E3/UL (ref 150–450)
RBC # BLD AUTO: 4.49 10E6/UL (ref 4.4–5.9)
WBC # BLD AUTO: 4.1 10E3/UL (ref 4–11)

## 2023-08-01 PROCEDURE — 36415 COLL VENOUS BLD VENIPUNCTURE: CPT | Performed by: FAMILY MEDICINE

## 2023-08-01 PROCEDURE — 99214 OFFICE O/P EST MOD 30 MIN: CPT | Performed by: FAMILY MEDICINE

## 2023-08-01 PROCEDURE — 85027 COMPLETE CBC AUTOMATED: CPT | Performed by: FAMILY MEDICINE

## 2023-08-01 NOTE — PROGRESS NOTES
[facility]  [address] [facility] [address]  Phone: [phone]  Fax: [phone]  Primary Provider: Mary Sprague  Pre-op Performing Provider: MARY SPRAGUE      PREOPERATIVE EVALUATION:  Today's date: 8/1/2023    Mary Kapadia is a 57 year old male who presents for a preoperative evaluation.      8/1/2023     9:34 AM   Additional Questions   Roomed by Nicky MACDONALD CMA       Surgical Information:  Surgery/Procedure: Right arthroscopy for torn meniscus   Surgery Location: [facility]   Surgeon: Dr. Merritt   Surgery Date: 8/4/2023  Time of Surgery: 12:00pm   Where patient plans to recover: At home with family  Fax number for surgical facility: [phone]    Assessment & Plan     The proposed surgical procedure is considered LOW risk.    Preoperative examination  Tear of meniscus of right knee as current injury, unspecified meniscus, unspecified tear type, subsequent encounter    Patient is approved for surgery  Hemoglobin is 14.2  Recent GFR was greater than 90  Recommend avoiding NSAIDs and OTC supplements prior to surgery  Follow-up as recommended by orthopedics    - CBC with platelets; Future  - CBC with platelets    Hypothyroidism, unspecified type  Currently stable    Continue levothyroxine    Elevated blood pressure without a diagnosis of hypertension    Continue to monitor blood pressure  Follow-up to recheck            - No identified additional risk factors other than previously addressed    Antiplatelet or Anticoagulation Medication Instructions:   - Patient is on no antiplatelet or anticoagulation medications.    Additional Medication Instructions:  Patient is to take all scheduled medications on the day of surgery    RECOMMENDATION:  APPROVAL GIVEN to proceed with proposed procedure, without further diagnostic evaluation.    Review of external notes as documented elsewhere in note        Subjective       HPI related to  upcoming procedure:     Eitan presents to the clinic for preoperative examination.  He has a meniscus tear in the right knee that has not responded to conservative therapy.  He is a candidate for right knee arthroscopy.    He has a known history of hypothyroidism, hypercholesterolemia, and bilateral hip osteoarthritis.  He has had previous bilateral hip replacement surgeries.  He has tolerated anesthesia without difficulty.     He continues to take levothyroxine.    Overall, he feels well. He is able to tolerate vigorous physical activity without difficulty.          8/1/2023     9:09 AM   Preop Questions   1. Have you ever had a heart attack or stroke? No   2. Have you ever had surgery on your heart or blood vessels, such as a stent placement, a coronary artery bypass, or surgery on an artery in your head, neck, heart, or legs? No   3. Do you have chest pain with activity? No   4. Do you have a history of  heart failure? No   5. Do you currently have a cold, bronchitis or symptoms of other infection? No   6. Do you have a cough, shortness of breath, or wheezing? No   7. Do you or anyone in your family have previous history of blood clots? No   8. Do you or does anyone in your family have a serious bleeding problem such as prolonged bleeding following surgeries or cuts? No   9. Have you ever had problems with anemia or been told to take iron pills? No   10. Have you had any abnormal blood loss such as black, tarry or bloody stools? No   11. Have you ever had a blood transfusion? No   12. Are you willing to have a blood transfusion if it is medically needed before, during, or after your surgery? Yes   13. Have you or any of your relatives ever had problems with anesthesia? No   14. Do you have sleep apnea, excessive snoring or daytime drowsiness? No   15. Do you have any artifical heart valves or other implanted medical devices like a pacemaker, defibrillator, or continuous glucose monitor? No   16. Do you have  artificial joints? YES - Bilateral hip replacements   17. Are you allergic to latex? No       Health Care Directive:  Patient does not have a Health Care Directive or Living Will: Discussed advance care planning with patient; information given to patient to review.    Preoperative Review of :   reviewed - no record of controlled substances prescribed.      Status of Chronic Conditions:  See problem list for active medical problems.  Problems all longstanding and stable, except as noted/documented.  See ROS for pertinent symptoms related to these conditions.    Review of Systems  Constitutional, neuro, ENT, endocrine, pulmonary, cardiac, gastrointestinal, genitourinary, musculoskeletal, integument and psychiatric systems are negative, except as otherwise noted.    Patient Active Problem List    Diagnosis Date Noted    Tubular adenoma 02/18/2021     Priority: Medium    Other hyperlipidemia 12/27/2020     Priority: Medium    Osteoarthritis of both hips      Priority: Medium     Created by Conversion        Obesity (BMI 35.0-39.9) with comorbidity (H) 10/23/2018     Priority: Medium    Hypothyroidism      Priority: Medium     Created by Conversion  Replacement Utility updated for latest IMO load        Hypercholesterolemia 04/08/2015     Priority: Medium    Lump In / On The Skin      Priority: Medium     Created by Conversion        Sebaceous Cyst      Priority: Medium     Created by Conversion          No past medical history on file.  Past Surgical History:   Procedure Laterality Date    JOINT REPLACEMENT      TOTAL HIP ARTHROPLASTY Left 02/2013    [facility]    TOTAL HIP ARTHROPLASTY Right 01/2018    [facility]     Current Outpatient Medications   Medication Sig Dispense Refill    levothyroxine (SYNTHROID/LEVOTHROID) 200 MCG tablet Take 1 tablet  by mouth once Daily 90 tablet 3       No Known Allergies     Social History     Tobacco Use    Smoking status: Never    Smokeless tobacco: Never  "  Substance Use Topics    Alcohol use: Not on file     Family History   Problem Relation Age of Onset    No Known Problems Mother     No Known Problems Father     Hypothyroidism Sister     Leukemia Sister     Diabetes Brother         Type 1 diabetes    Hypothyroidism Brother     No Known Problems Brother     No Known Problems Brother     No Known Problems Sister      History   Drug Use Not on file         Objective     BP (!) 154/83 (BP Location: Left arm, Patient Position: Sitting, Cuff Size: Adult Large)   Pulse 63   Temp 98.3  F (36.8  C) (Oral)   Resp 16   Ht 1.85 m (6' 0.84\")   Wt 120.7 kg (266 lb)   SpO2 100%   BMI 35.25 kg/m      Physical Exam    GENERAL APPEARANCE: healthy, alert and no distress     EYES: EOMI,  PERRL     HENT: ear canals and TM's normal and nose and mouth without ulcers or lesions     RESP: lungs clear to auscultation - no rales, rhonchi or wheezes     CV: regular rates and rhythm, normal S1 S2, no S3 or S4 and no murmur, click or rub     MS: extremities normal- no gross deformities noted, no evidence of inflammation in joints, FROM in all extremities.     PSYCH: mentation appears normal. and affect normal/bright     LYMPHATICS: No cervical adenopathy    Recent Labs   Lab Test 06/28/23  0921      POTASSIUM 4.9   CR 0.94        Diagnostics:  No results found for this or any previous visit (from the past 48 hour(s)).   No EKG required, no history of coronary heart disease, significant arrhythmia, peripheral arterial disease or other structural heart disease.    Revised Cardiac Risk Index (RCRI):  The patient has the following serious cardiovascular risks for perioperative complications:   - No serious cardiac risks = 0 points     RCRI Interpretation: 0 points: Class I (very low risk - 0.4% complication rate)         Signed Electronically by: Tomasz Sprague MD  Copy of this evaluation report is provided to requesting physician.      "

## 2023-08-01 NOTE — PATIENT INSTRUCTIONS
For informational purposes only. Not to replace the advice of your health care provider. Copyright   2003,  [facility]. All rights reserved. Clinically reviewed by Hue Busch MD. [employer] [id number] - REV .  Preparing for Your Surgery  Getting started  A nurse will call you to review your health history and instructions. They will give you an arrival time based on your scheduled surgery time. Please be ready to share:  Your doctor's clinic name and phone number  Your medical, surgical, and anesthesia history  A list of allergies and sensitivities  A list of medicines, including herbal treatments and over-the-counter drugs  Whether the patient has a legal guardian (ask how to send us the papers in advance)  Please tell us if you're pregnant--or if there's any chance you might be pregnant. Some surgeries may injure a fetus (unborn baby), so they require a pregnancy test. Surgeries that are safe for a fetus don't always need a test, and you can choose whether to have one.   If you have a child who's having surgery, please ask for a copy of Preparing for Your Child's Surgery.    Preparing for surgery  Within 10 to 30 days of surgery: Have a pre-op exam (sometimes called an H&P, or History and Physical). This can be done at a clinic or pre-operative center.  If you're having a , you may not need this exam. Talk to your care team.  At your pre-op exam, talk to your care team about all medicines you take. If you need to stop any medicines before surgery, ask when to start taking them again.  We do this for your safety. Many medicines can make you bleed too much during surgery. Some change how well surgery (anesthesia) drugs work.  Call your insurance company to let them know you're having surgery. (If you don't have insurance, call [phone].)  Call your clinic if there's any change in your health. This includes signs of a cold or flu (sore throat, runny nose, cough, rash, fever). It also  includes a scrape or scratch near the surgery site.  If you have questions on the day of surgery, call your hospital or surgery center.  Eating and drinking guidelines  For your safety: Unless your surgeon tells you otherwise, follow the guidelines below.  Eat and drink as usual until 8 hours before you arrive for surgery. After that, no food or milk.  Drink clear liquids until 2 hours before you arrive. These are liquids you can see through, like water, Gatorade, and Propel Water. They also include plain black coffee and tea (no cream or milk), candy, and breath mints. You can spit out gum when you arrive.  If you drink alcohol: Stop drinking it the night before surgery.  If your care team tells you to take medicine on the morning of surgery, it's okay to take it with a sip of water.  Preventing infection  Shower or bathe the night before and morning of your surgery. Follow the instructions your clinic gave you. (If no instructions, use regular soap.)  Don't shave or clip hair near your surgery site. We'll remove the hair if needed.  Don't smoke or vape the morning of surgery. You may chew nicotine gum up to 2 hours before surgery. A nicotine patch is okay.  Note: Some surgeries require you to completely quit smoking and nicotine. Check with your surgeon.  Your care team will make every effort to keep you safe from infection. We will:  Clean our hands often with soap and water (or an alcohol-based hand rub).  Clean the skin at your surgery site with a special soap that kills germs.  Give you a special gown to keep you warm. (Cold raises the risk of infection.)  Wear special hair covers, masks, gowns and gloves during surgery.  Give antibiotic medicine, if prescribed. Not all surgeries need antibiotics.  What to bring on the day of surgery  Photo ID and insurance card  Copy of your health care directive, if you have one  Glasses and hearing aids (bring cases)  You can't wear contacts during surgery  Inhaler and eye  drops, if you use them (tell us about these when you arrive)  CPAP machine or breathing device, if you use them  A few personal items, if spending the night  If you have . . .  A pacemaker, ICD (cardiac defibrillator) or other implant: Bring the ID card.  An implanted stimulator: Bring the remote control.  A legal guardian: Bring a copy of the certified (court-stamped) guardianship papers.  Please remove any jewelry, including body piercings. Leave jewelry and other valuables at home.  If you're going home the day of surgery  You must have a responsible adult drive you home. They should stay with you overnight as well.  If you don't have someone to stay with you, and you aren't safe to go home alone, we may keep you overnight. Insurance often won't pay for this.  After surgery  If it's hard to control your pain or you need more pain medicine, please call your surgeon's office.  Questions?   If you have any questions for your care team, list them here: _________________________________________________________________________________________________________________________________________________________________________ ____________________________________ ____________________________________ ____________________________________    How to Take Your Medication Before Surgery  - Take all of your medications before surgery as usual

## 2023-08-01 NOTE — LETTER
August 3, 2023      Tomasz Kapadia  [address] [facility] [facility] [address]        Dear Eitan,    Your blood counts prior to your surgery are completely normal.    Tomasz Sprague MD      Resulted Orders   CBC with platelets   Result Value Ref Range    WBC Count 4.1 4.0 - 11.0 10e3/uL    RBC Count 4.49 4.40 - 5.90 10e6/uL    Hemoglobin 14.2 13.3 - 17.7 g/dL    Hematocrit 42.2 40.0 - 53.0 %    MCV 94 78 - 100 fL    MCH 31.6 26.5 - 33.0 pg    MCHC 33.6 31.5 - 36.5 g/dL    RDW 11.9 10.0 - 15.0 %    Platelet Count 235 150 - 450 10e3/uL       If you have any questions or concerns, please call the clinic at the number listed above.       Sincerely,      Tomasz Sprague MD

## 2023-08-03 ENCOUNTER — TELEPHONE (OUTPATIENT)
Dept: FAMILY MEDICINE | Facility: CLINIC | Age: 58
End: 2023-08-03
Payer: COMMERCIAL

## 2023-08-03 NOTE — TELEPHONE ENCOUNTER
FYI - Status Update    Who is Calling: Jayna from [facility]     Update: States patient has an appt tomorrow(8/4) and they need signed pre-op visit fax to [phone]. They have the labs but not the pre-op. Please advise     Does caller want a call/response back: No

## 2023-08-04 ENCOUNTER — TRANSFERRED RECORDS (OUTPATIENT)
Dept: HEALTH INFORMATION MANAGEMENT | Facility: CLINIC | Age: 58
End: 2023-08-04
Payer: COMMERCIAL

## 2023-08-17 ENCOUNTER — TRANSFERRED RECORDS (OUTPATIENT)
Dept: HEALTH INFORMATION MANAGEMENT | Facility: CLINIC | Age: 58
End: 2023-08-17
Payer: COMMERCIAL

## 2024-02-16 ENCOUNTER — OFFICE VISIT (OUTPATIENT)
Dept: FAMILY MEDICINE | Facility: CLINIC | Age: 59
End: 2024-02-16
Payer: COMMERCIAL

## 2024-02-16 ENCOUNTER — ANCILLARY PROCEDURE (OUTPATIENT)
Dept: GENERAL RADIOLOGY | Facility: CLINIC | Age: 59
End: 2024-02-16
Attending: FAMILY MEDICINE
Payer: COMMERCIAL

## 2024-02-16 VITALS
HEIGHT: 73 IN | RESPIRATION RATE: 16 BRPM | BODY MASS INDEX: 35.83 KG/M2 | WEIGHT: 270.38 LBS | OXYGEN SATURATION: 100 % | DIASTOLIC BLOOD PRESSURE: 88 MMHG | HEART RATE: 67 BPM | SYSTOLIC BLOOD PRESSURE: 182 MMHG | TEMPERATURE: 98.4 F

## 2024-02-16 DIAGNOSIS — R05.8 DRY COUGH: ICD-10-CM

## 2024-02-16 DIAGNOSIS — R05.2 SUBACUTE COUGH: Primary | ICD-10-CM

## 2024-02-16 DIAGNOSIS — R06.02 SOB (SHORTNESS OF BREATH): ICD-10-CM

## 2024-02-16 DIAGNOSIS — I10 ESSENTIAL HYPERTENSION: ICD-10-CM

## 2024-02-16 DIAGNOSIS — R05.2 SUBACUTE COUGH: ICD-10-CM

## 2024-02-16 DIAGNOSIS — E03.9 HYPOTHYROIDISM, UNSPECIFIED TYPE: ICD-10-CM

## 2024-02-16 LAB
ALBUMIN SERPL BCG-MCNC: 4.3 G/DL (ref 3.5–5.2)
ALP SERPL-CCNC: 81 U/L (ref 40–150)
ALT SERPL W P-5'-P-CCNC: 28 U/L (ref 0–70)
ANION GAP SERPL CALCULATED.3IONS-SCNC: 8 MMOL/L (ref 7–15)
AST SERPL W P-5'-P-CCNC: 28 U/L (ref 0–45)
BASOPHILS # BLD AUTO: 0 10E3/UL (ref 0–0.2)
BASOPHILS NFR BLD AUTO: 1 %
BILIRUB SERPL-MCNC: 0.4 MG/DL
BUN SERPL-MCNC: 11 MG/DL (ref 6–20)
CALCIUM SERPL-MCNC: 9.1 MG/DL (ref 8.6–10)
CHLORIDE SERPL-SCNC: 99 MMOL/L (ref 98–107)
CREAT SERPL-MCNC: 0.86 MG/DL (ref 0.67–1.17)
DEPRECATED HCO3 PLAS-SCNC: 27 MMOL/L (ref 22–29)
EGFRCR SERPLBLD CKD-EPI 2021: >90 ML/MIN/1.73M2
EOSINOPHIL # BLD AUTO: 0.2 10E3/UL (ref 0–0.7)
EOSINOPHIL NFR BLD AUTO: 5 %
ERYTHROCYTE [DISTWIDTH] IN BLOOD BY AUTOMATED COUNT: 12.1 % (ref 10–15)
GLUCOSE SERPL-MCNC: 94 MG/DL (ref 70–99)
HCT VFR BLD AUTO: 41.4 % (ref 40–53)
HGB BLD-MCNC: 13.8 G/DL (ref 13.3–17.7)
IMM GRANULOCYTES # BLD: 0 10E3/UL
IMM GRANULOCYTES NFR BLD: 0 %
LYMPHOCYTES # BLD AUTO: 1.3 10E3/UL (ref 0.8–5.3)
LYMPHOCYTES NFR BLD AUTO: 28 %
MCH RBC QN AUTO: 30.9 PG (ref 26.5–33)
MCHC RBC AUTO-ENTMCNC: 33.3 G/DL (ref 31.5–36.5)
MCV RBC AUTO: 93 FL (ref 78–100)
MONOCYTES # BLD AUTO: 0.4 10E3/UL (ref 0–1.3)
MONOCYTES NFR BLD AUTO: 9 %
NEUTROPHILS # BLD AUTO: 2.7 10E3/UL (ref 1.6–8.3)
NEUTROPHILS NFR BLD AUTO: 58 %
NT-PROBNP SERPL-MCNC: 73 PG/ML (ref 0–900)
PLATELET # BLD AUTO: 248 10E3/UL (ref 150–450)
POTASSIUM SERPL-SCNC: 4.9 MMOL/L (ref 3.4–5.3)
PROT SERPL-MCNC: 7 G/DL (ref 6.4–8.3)
RBC # BLD AUTO: 4.46 10E6/UL (ref 4.4–5.9)
SODIUM SERPL-SCNC: 134 MMOL/L (ref 135–145)
WBC # BLD AUTO: 4.7 10E3/UL (ref 4–11)

## 2024-02-16 PROCEDURE — 85025 COMPLETE CBC W/AUTO DIFF WBC: CPT | Performed by: FAMILY MEDICINE

## 2024-02-16 PROCEDURE — 83880 ASSAY OF NATRIURETIC PEPTIDE: CPT | Performed by: FAMILY MEDICINE

## 2024-02-16 PROCEDURE — 80053 COMPREHEN METABOLIC PANEL: CPT | Performed by: FAMILY MEDICINE

## 2024-02-16 PROCEDURE — 99214 OFFICE O/P EST MOD 30 MIN: CPT | Performed by: FAMILY MEDICINE

## 2024-02-16 PROCEDURE — 36415 COLL VENOUS BLD VENIPUNCTURE: CPT | Performed by: FAMILY MEDICINE

## 2024-02-16 PROCEDURE — 71046 X-RAY EXAM CHEST 2 VIEWS: CPT | Mod: TC | Performed by: RADIOLOGY

## 2024-02-16 RX ORDER — LORATADINE 10 MG/1
10 TABLET ORAL DAILY
Qty: 30 TABLET | Refills: 3 | Status: SHIPPED | OUTPATIENT
Start: 2024-02-16 | End: 2024-03-04

## 2024-02-16 RX ORDER — ALBUTEROL SULFATE 90 UG/1
2 AEROSOL, METERED RESPIRATORY (INHALATION) EVERY 6 HOURS PRN
Qty: 18 G | Refills: 0 | Status: SHIPPED | OUTPATIENT
Start: 2024-02-16 | End: 2024-03-04

## 2024-02-16 ASSESSMENT — ENCOUNTER SYMPTOMS: COUGH: 1

## 2024-02-16 NOTE — PROGRESS NOTES
Assessment & Plan     ICD-10-CM    1. Subacute cough  R05.2 XR Chest 2 Views     CBC with platelets and differential     loratadine (CLARITIN) 10 MG tablet     albuterol (PROAIR HFA/PROVENTIL HFA/VENTOLIN HFA) 108 (90 Base) MCG/ACT inhaler     amoxicillin-clavulanate (AUGMENTIN) 875-125 MG tablet     CBC with platelets and differential      2. Dry cough  R05.8 BNP-N terminal pro     BNP-N terminal pro      3. Hypothyroidism, unspecified type  E03.9       4. Essential hypertension  I10 Comprehensive metabolic panel (BMP + Alb, Alk Phos, ALT, AST, Total. Bili, TP)     Comprehensive metabolic panel (BMP + Alb, Alk Phos, ALT, AST, Total. Bili, TP)      5. SOB (shortness of breath)  R06.02         Patient is a 58-year-old gentleman with hypothyroidism and hypertension(though not diagnosed but noted very high blood pressure in the last few visits) who presents today for persistent cough.  He describes that he had a viral URI in December and then since then he has had a cough that he cannot get rid of.  He denies any fevers.  He has some postnasal drip and expectoration.  No loss of appetite or weight.  Exam is fairly normal.  An x-ray was done and reviewed by me and does not show any acute changes/pneumonia.  White count was normal.  Possible persistent sinusitis with postnasal drip and we will treat with antibiotic.  If not improving, he is encouraged to follow-up for further evaluation.  Symptom treatment as above with albuterol and for possible allergies to take Claritin.  Blood pressure is very elevated.  Discussed that he likely has essential hypertension.  This was noted in the last couple of visits to.  Patient works at [employer] and will keep a log of blood pressure and he will follow-up in 2-4 weeks.  He verbalizes understanding.  He has hypothyroidism and reviewed the TSH last June was in normal range.  He is on levothyroxine 200 mcg.      BMI  Estimated body mass index is 36.17 kg/m  as calculated from the  "following:    Height as of this encounter: 1.842 m (6' 0.5\").    Weight as of this encounter: 122.6 kg (270 lb 6 oz).         MEDICATIONS:   Orders Placed This Encounter   Medications    loratadine (CLARITIN) 10 MG tablet     Sig: Take 1 tablet (10 mg) by mouth daily     Dispense:  30 tablet     Refill:  3    albuterol (PROAIR HFA/PROVENTIL HFA/VENTOLIN HFA) 108 (90 Base) MCG/ACT inhaler     Sig: Inhale 2 puffs into the lungs every 6 hours as needed for shortness of breath, wheezing or cough     Dispense:  18 g     Refill:  0     Pharmacy may dispense brand covered by insurance (Proair, or proventil or ventolin or generic albuterol inhaler)    amoxicillin-clavulanate (AUGMENTIN) 875-125 MG tablet     Sig: Take 1 tablet by mouth 2 times daily     Dispense:  20 tablet     Refill:  0          - Continue other medications without change  See Patient Instructions    Ifeanyi Tolbert is a 58 year old, presenting for the following health issues:  Cough (X 3 wks)        2/16/2024     8:01 AM   Additional Questions   Roomed by Sydnie CARRERA CMA   Accompanied by Self     History of Present Illness       Reason for visit:  Cough  Symptom onset:  More than a month  Symptoms include:  Dry cough, chest congestion  Symptom intensity:  Moderate  Symptom progression:  Staying the same  Had these symptoms before:  No  What makes it worse:  Laying down then turning over  What makes it better:  Keeping mouth moist    He eats 2-3 servings of fruits and vegetables daily.He consumes 0 sweetened beverage(s) daily.He exercises with enough effort to increase his heart rate 60 or more minutes per day.  He exercises with enough effort to increase his heart rate 6 days per week.   He is taking medications regularly.     Patient Active Problem List   Diagnosis    Osteoarthritis of both hips    Hypothyroidism    Lump In / On The Skin    Sebaceous Cyst    Hypercholesterolemia    Obesity (BMI 35.0-39.9) with comorbidity (H)    Other hyperlipidemia " "   Tubular adenoma    Osteoarthritis of hip     Current Outpatient Medications   Medication    albuterol (PROAIR HFA/PROVENTIL HFA/VENTOLIN HFA) 108 (90 Base) MCG/ACT inhaler    amoxicillin-clavulanate (AUGMENTIN) 875-125 MG tablet    levothyroxine (SYNTHROID/LEVOTHROID) 200 MCG tablet    loratadine (CLARITIN) 10 MG tablet     No current facility-administered medications for this visit.           Review of Systems  Constitutional, HEENT, cardiovascular, pulmonary, gi and gu systems are negative, except as otherwise noted.      Objective    BP (!) 182/88 (BP Location: Left arm, Patient Position: Sitting, Cuff Size: Adult Large)   Pulse 67   Temp 98.4  F (36.9  C) (Oral)   Resp 16   Ht 1.842 m (6' 0.5\")   Wt 122.6 kg (270 lb 6 oz)   SpO2 100%   BMI 36.17 kg/m    Body mass index is 36.17 kg/m .  Physical Exam   GENERAL: alert and no distress  EYES: Eyes grossly normal to inspection, PERRL and conjunctivae and sclerae normal  HENT: ear canals and TM's normal, nose and mouth without ulcers or lesions  NECK: no adenopathy, no asymmetry, masses, or scars  RESP: lungs clear to auscultation - no rales, rhonchi or wheezes  CV: regular rate and rhythm, normal S1 S2, no S3 or S4, no murmur, click or rub, no peripheral edema  ABDOMEN: soft, nontender, no hepatosplenomegaly, no masses and bowel sounds normal  MS: no gross musculoskeletal defects noted, no edema    Results for orders placed or performed in visit on 02/16/24   XR Chest 2 Views     Status: None    Narrative    EXAM: XR CHEST 2 VIEWS  LOCATION: [facility]  DATE/TIME: 2/16/2024 9:03 AM CST    INDICATION: Subacute cough  COMPARISON: None.      Impression    IMPRESSION:     The cardiac silhouette is normal in size. Hilar and mediastinal interfaces are normal.    The lungs are symmetrically inflated. There are no airspace or interstitial opacities.    Diaphragm curvature is normal. No pleural fluid is present.    Small thoracic spine " degenerative osteophytes are present.      Results for orders placed or performed in visit on 02/16/24   Comprehensive metabolic panel (BMP + Alb, Alk Phos, ALT, AST, Total. Bili, TP)     Status: Abnormal   Result Value Ref Range    Sodium 134 (L) 135 - 145 mmol/L    Potassium 4.9 3.4 - 5.3 mmol/L    Carbon Dioxide (CO2) 27 22 - 29 mmol/L    Anion Gap 8 7 - 15 mmol/L    Urea Nitrogen 11.0 6.0 - 20.0 mg/dL    Creatinine 0.86 0.67 - 1.17 mg/dL    GFR Estimate >90 >60 mL/min/1.73m2    Calcium 9.1 8.6 - 10.0 mg/dL    Chloride 99 98 - 107 mmol/L    Glucose 94 70 - 99 mg/dL    Alkaline Phosphatase 81 40 - 150 U/L    AST 28 0 - 45 U/L    ALT 28 0 - 70 U/L    Protein Total 7.0 6.4 - 8.3 g/dL    Albumin 4.3 3.5 - 5.2 g/dL    Bilirubin Total 0.4 <=1.2 mg/dL   BNP-N terminal pro     Status: Normal   Result Value Ref Range    N Terminal Pro BNP Outpatient 73 0 - 900 pg/mL   CBC with platelets and differential     Status: None   Result Value Ref Range    WBC Count 4.7 4.0 - 11.0 10e3/uL    RBC Count 4.46 4.40 - 5.90 10e6/uL    Hemoglobin 13.8 13.3 - 17.7 g/dL    Hematocrit 41.4 40.0 - 53.0 %    MCV 93 78 - 100 fL    MCH 30.9 26.5 - 33.0 pg    MCHC 33.3 31.5 - 36.5 g/dL    RDW 12.1 10.0 - 15.0 %    Platelet Count 248 150 - 450 10e3/uL    % Neutrophils 58 %    % Lymphocytes 28 %    % Monocytes 9 %    % Eosinophils 5 %    % Basophils 1 %    % Immature Granulocytes 0 %    Absolute Neutrophils 2.7 1.6 - 8.3 10e3/uL    Absolute Lymphocytes 1.3 0.8 - 5.3 10e3/uL    Absolute Monocytes 0.4 0.0 - 1.3 10e3/uL    Absolute Eosinophils 0.2 0.0 - 0.7 10e3/uL    Absolute Basophils 0.0 0.0 - 0.2 10e3/uL    Absolute Immature Granulocytes 0.0 <=0.4 10e3/uL   CBC with platelets and differential     Status: None    Narrative    The following orders were created for panel order CBC with platelets and differential.  Procedure                               Abnormality         Status                     ---------                                -----------         ------                     CBC with platelets and d...[[id number]]                      Final result                 Please view results for these tests on the individual orders.           Signed Electronically by: Debby Galvez MD

## 2024-02-20 ENCOUNTER — TELEPHONE (OUTPATIENT)
Dept: FAMILY MEDICINE | Facility: CLINIC | Age: 59
End: 2024-02-20
Payer: COMMERCIAL

## 2024-02-20 NOTE — TELEPHONE ENCOUNTER
----- Message from Debby Galvez MD sent at 2/18/2024  6:49 PM CST -----  Please inform patient that his blood work is fairly normal.  There is no sign of infection or congestion of the heart.  X-ray also does not show any pneumonia.  He should finish the medication and follow-up for blood pressure as advised.    Debby Galvez MD    Please mail a letter too

## 2024-02-20 NOTE — TELEPHONE ENCOUNTER
Patient Returning Call    Reason for call:  Returning the clinic's call     Information relayed to patient:  Read Dr. Galvez's notation. Voiced understanding and had no further questions or concerns.     Patient has additional questions:  No      Okay to leave a detailed message?: No at Cell number on file:    Telephone Information:   Mobile [phone]

## 2024-03-04 ENCOUNTER — OFFICE VISIT (OUTPATIENT)
Dept: FAMILY MEDICINE | Facility: CLINIC | Age: 59
End: 2024-03-04
Attending: FAMILY MEDICINE
Payer: COMMERCIAL

## 2024-03-04 VITALS
BODY MASS INDEX: 35.76 KG/M2 | TEMPERATURE: 98.6 F | HEIGHT: 73 IN | SYSTOLIC BLOOD PRESSURE: 163 MMHG | WEIGHT: 269.8 LBS | DIASTOLIC BLOOD PRESSURE: 80 MMHG | HEART RATE: 68 BPM | RESPIRATION RATE: 16 BRPM | OXYGEN SATURATION: 99 %

## 2024-03-04 DIAGNOSIS — R03.0 ELEVATED BLOOD PRESSURE READING WITHOUT DIAGNOSIS OF HYPERTENSION: ICD-10-CM

## 2024-03-04 DIAGNOSIS — R21 RASH: Primary | ICD-10-CM

## 2024-03-04 DIAGNOSIS — E03.9 HYPOTHYROIDISM, UNSPECIFIED TYPE: ICD-10-CM

## 2024-03-04 DIAGNOSIS — T78.40XA ALLERGIC REACTION, INITIAL ENCOUNTER: ICD-10-CM

## 2024-03-04 PROCEDURE — 99213 OFFICE O/P EST LOW 20 MIN: CPT | Performed by: FAMILY MEDICINE

## 2024-03-04 PROCEDURE — G2211 COMPLEX E/M VISIT ADD ON: HCPCS | Performed by: FAMILY MEDICINE

## 2024-03-04 RX ORDER — PREDNISONE 20 MG/1
TABLET ORAL
Qty: 17 TABLET | Refills: 0 | Status: SHIPPED | OUTPATIENT
Start: 2024-03-04

## 2024-03-04 NOTE — PROGRESS NOTES
"  Assessment & Plan     Rash  Allergic reaction, initial encounter    Augmentin has been discontinued  Prescribed a prednisone taper  He may continue Benadryl 50 mg every 6 hours or consider loratadine/Claritin 10 mg twice daily in the short-term  Augmentin will be listed as an allergy  Recommend follow-up if not improving    - predniSONE (DELTASONE) 20 MG tablet; Take one PO BID x 4 days then one PO Qday x 7 days  then 1/2 PO Qday x 4 days      Hypothyroidism, unspecified type  Continue levothyroxine  Recommend checking a thyroid cascade in the future    Elevated blood pressure reading without diagnosis of hypertension  Reviewed his elevated blood pressure readings    Recommend working on dietary and lifestyle changes  Recommend home blood pressure monitoring  Follow-up to recheck    The longitudinal plan of care for the diagnosis(es)/condition(s) as documented were addressed during this visit. Due to the added complexity in care, I will continue to support Tomasz in the subsequent management and with ongoing continuity of care.    Hypothyrodism            BMI  Estimated body mass index is 36.09 kg/m  as calculated from the following:    Height as of this encounter: 1.842 m (6' 0.5\").    Weight as of this encounter: 122.4 kg (269 lb 12.8 oz).             Subjective   Tomasz is a 58 year old, presenting for the following health issues:  Follow Up (Had allergic reaction to antibiotic ) and Blood Pressure Check        3/4/2024     1:43 PM   Additional Questions   Roomed by Nicky MACDONALD CMA     History of Present Illness       Hypertension: He presents for follow up of hypertension.  He does not check blood pressure  regularly outside of the clinic. Outside blood pressures have been over 140/90. He does not follow a low salt diet.     Reason for visit:  Followup for high blood pressure and a rash that has developped    He eats 2-3 servings of fruits and vegetables daily.He consumes 1 sweetened beverage(s) daily.He exercises " "with enough effort to increase his heart rate 60 or more minutes per day.  He exercises with enough effort to increase his heart rate 6 days per week.   He is taking medications regularly.     Eitan is a pleasant 58-year-old male who presents to the clinic in follow-up for a rash.  He was seen recently for a cough and acute upper respiratory infection with possible associated sinusitis.  He was treated with a course of prednisone.  He notes that he has developed a rash and significant itching over much of his body.  He has been taking Claritin and Benadryl.  He did discontinue the Augmentin but his symptoms have persisted.  He cannot think of any specific changes and soaps, lotions, or detergents.  He denies shortness of breath.    He has a known history of hypothyroidism, hypercholesterolemia, and bilateral hip osteoarthritis.  He has had previous bilateral hip replacement surgeries.  He has tolerated anesthesia without difficulty.     He continues to take levothyroxine.    Following the last appointment he did have right knee surgery for a torn meniscus and that generally went well.       Objective    BP (!) 163/80 (BP Location: Left arm, Patient Position: Sitting, Cuff Size: Adult Large)   Pulse 68   Temp 98.6  F (37  C) (Oral)   Resp 16   Ht 1.842 m (6' 0.5\")   Wt 122.4 kg (269 lb 12.8 oz)   SpO2 99%   BMI 36.09 kg/m    Body mass index is 36.09 kg/m .  Physical Exam   GENERAL: alert and no distress  EYES: Eyes grossly normal to inspection, PERRL and conjunctivae and sclerae normal  RESP: lungs clear to auscultation - no rales, rhonchi or wheezes  CV: regular rate and rhythm, normal S1 S2, no S3 or S4, no murmur, click or rub, no peripheral edema  MS: no gross musculoskeletal defects noted, no edema  SKIN: He has a diffuse rash characterized by erythematous patches and macular lesions   No vesicles are evident  NEURO: Normal strength and tone, mentation intact and speech normal  PSYCH: mentation appears " normal, affect normal/bright            Signed Electronically by: Tomasz Sprague MD

## 2024-03-04 NOTE — PATIENT INSTRUCTIONS
Eitan,    I sent a prescription for a prednisone taper to your pharmacy  This is a steroid medication which has anti-inflammatory properties and will help your itching  You may also continue Benadryl 50 mg every 6 hours or consider loratadine/Claritin 10 mg twice daily in the short-term  Augmentin will be listed as an allergy  Please let me know if not improving  I recommend that you check your blood pressure at home  Your goal blood pressure is 120/80 or lower  If your blood pressure remains elevated you should consider a blood pressure medication  I suggest follow-up in 2-3 months to recheck    Tomasz Sprague MD

## 2024-07-12 ENCOUNTER — TELEPHONE (OUTPATIENT)
Dept: FAMILY MEDICINE | Facility: CLINIC | Age: 59
End: 2024-07-12
Payer: COMMERCIAL

## 2024-07-12 DIAGNOSIS — E03.9 HYPOTHYROIDISM, UNSPECIFIED TYPE: ICD-10-CM

## 2024-07-12 RX ORDER — LEVOTHYROXINE SODIUM 200 UG/1
TABLET ORAL
Qty: 90 TABLET | Refills: 0 | Status: SHIPPED | OUTPATIENT
Start: 2024-07-12

## 2024-07-12 NOTE — TELEPHONE ENCOUNTER
Called and spoke with patient, relayed recommendations and scheduled patient for lab only visit on 7/25.    Judith Nicole RN

## 2024-07-12 NOTE — TELEPHONE ENCOUNTER
Please call. I refilled his thyroid medication but he is overdue for a lab test. I will enter an order so he may do a thyroid blood test. He does not need to be fasting.

## 2024-07-25 ENCOUNTER — LAB (OUTPATIENT)
Dept: LAB | Facility: CLINIC | Age: 59
End: 2024-07-25
Payer: COMMERCIAL

## 2024-07-25 DIAGNOSIS — E03.9 HYPOTHYROIDISM, UNSPECIFIED TYPE: ICD-10-CM

## 2024-07-25 LAB
T4 FREE SERPL-MCNC: 1.8 NG/DL (ref 0.9–1.7)
TSH SERPL DL<=0.005 MIU/L-ACNC: 0.16 UIU/ML (ref 0.3–4.2)

## 2024-07-25 PROCEDURE — 84439 ASSAY OF FREE THYROXINE: CPT

## 2024-07-25 PROCEDURE — 84443 ASSAY THYROID STIM HORMONE: CPT

## 2024-07-25 PROCEDURE — 36415 COLL VENOUS BLD VENIPUNCTURE: CPT

## 2024-10-11 DIAGNOSIS — E03.9 HYPOTHYROIDISM, UNSPECIFIED TYPE: Primary | ICD-10-CM

## 2024-10-11 RX ORDER — LEVOTHYROXINE SODIUM 175 UG/1
175 TABLET ORAL DAILY
Qty: 90 TABLET | Refills: 1 | Status: SHIPPED | OUTPATIENT
Start: 2024-10-11

## 2025-04-21 ENCOUNTER — OFFICE VISIT (OUTPATIENT)
Dept: FAMILY MEDICINE | Facility: CLINIC | Age: 60
End: 2025-04-21
Payer: COMMERCIAL

## 2025-04-21 VITALS
TEMPERATURE: 97.6 F | OXYGEN SATURATION: 100 % | WEIGHT: 277.6 LBS | HEIGHT: 73 IN | HEART RATE: 61 BPM | BODY MASS INDEX: 36.79 KG/M2 | SYSTOLIC BLOOD PRESSURE: 173 MMHG | RESPIRATION RATE: 16 BRPM | DIASTOLIC BLOOD PRESSURE: 85 MMHG

## 2025-04-21 DIAGNOSIS — R03.0 ELEVATED BLOOD PRESSURE READING WITHOUT DIAGNOSIS OF HYPERTENSION: ICD-10-CM

## 2025-04-21 DIAGNOSIS — E03.9 HYPOTHYROIDISM, UNSPECIFIED TYPE: Primary | ICD-10-CM

## 2025-04-21 DIAGNOSIS — K59.00 CONSTIPATION, UNSPECIFIED CONSTIPATION TYPE: ICD-10-CM

## 2025-04-21 PROCEDURE — G2211 COMPLEX E/M VISIT ADD ON: HCPCS | Performed by: FAMILY MEDICINE

## 2025-04-21 PROCEDURE — 84443 ASSAY THYROID STIM HORMONE: CPT | Performed by: FAMILY MEDICINE

## 2025-04-21 PROCEDURE — 3079F DIAST BP 80-89 MM HG: CPT | Performed by: FAMILY MEDICINE

## 2025-04-21 PROCEDURE — 36415 COLL VENOUS BLD VENIPUNCTURE: CPT | Performed by: FAMILY MEDICINE

## 2025-04-21 PROCEDURE — 3077F SYST BP >= 140 MM HG: CPT | Performed by: FAMILY MEDICINE

## 2025-04-21 PROCEDURE — 80048 BASIC METABOLIC PNL TOTAL CA: CPT | Performed by: FAMILY MEDICINE

## 2025-04-21 PROCEDURE — 99213 OFFICE O/P EST LOW 20 MIN: CPT | Performed by: FAMILY MEDICINE

## 2025-04-21 NOTE — PATIENT INSTRUCTIONS
Eitan,    We would check your laboratory testing today as discussed  We will consider adjustments of your thyroid medication as needed  Your blood pressure is elevated  Your goal blood pressure is less than 130/85  Encouraged you to get a home blood pressure cuff and get multiple measurements at home  I prefer for you to have the devices that go over the arm (and not the wrist). [employer] is one brand to consider but there are others  Continue to work on diet and exercise  Limit sodium in your diet  Weight loss would be helpful for lowering blood pressure     Follow-up with orthopedics as planned for your knees    Please let me know if you have questions or concerns    Tomasz Sprague MD

## 2025-04-21 NOTE — PROGRESS NOTES
"  {PROVIDER CHARTING PREFERENCE:215253}    Subjective   Tomasz is a 59 year old, presenting for the following health issues:  Follow Up (Thyroid check)        4/21/2025     1:52 PM   Additional Questions   Roomed by Nicky MACDONALD CMA     History of Present Illness       Hypothyroidism:     Since last visit, patient describes the following symptoms::  None    He eats 2-3 servings of fruits and vegetables daily.He consumes 1 sweetened beverage(s) daily.He exercises with enough effort to increase his heart rate 60 or more minutes per day.  He exercises with enough effort to increase his heart rate 6 days per week.   He is taking medications regularly.        {MA/LPN/RN Pre-Provider Visit Orders- hCG/UA/Strep (Optional):404729}  {SUPERLIST (Optional):021473}  {additonal problems for provider to add (Optional):809247}    {ROS Picklists (Optional):900322}      Objective    BP (!) 173/85 (BP Location: Left arm, Patient Position: Sitting, Cuff Size: Adult Large)   Pulse 61   Temp 97.6  F (36.4  C) (Oral)   Resp 16   Ht 1.842 m (6' 0.5\")   Wt 125.9 kg (277 lb 9.6 oz)   SpO2 100%   BMI 37.13 kg/m    Body mass index is 37.13 kg/m .  Physical Exam   {Exam List (Optional):775862}    {Diagnostic Test Results (Optional):918659}        Signed Electronically by: Tomasz Sprague MD  {Email feedback regarding this note to [email or web address]   :579183}  " "hypercholesterolemia, and bilateral hip osteoarthritis.  He has had previous bilateral hip replacement surgeries.  He can have some right knee discomfort.  Will continue to follow-up with orthopedics.    He does note occasional constipation.     He denies chest pain, shortness of breath, palpitations, or headaches.                   4/21/2025     1:52 PM   Additional Questions   Roomed by Nicky MACDONALD CMA     History of Present Illness       Hypothyroidism:     Since last visit, patient describes the following symptoms::  None    He eats 2-3 servings of fruits and vegetables daily.He consumes 1 sweetened beverage(s) daily.He exercises with enough effort to increase his heart rate 60 or more minutes per day.  He exercises with enough effort to increase his heart rate 6 days per week.   He is taking medications regularly.                      Objective    BP (!) 173/85 (BP Location: Left arm, Patient Position: Sitting, Cuff Size: Adult Large)   Pulse 61   Temp 97.6  F (36.4  C) (Oral)   Resp 16   Ht 1.842 m (6' 0.5\")   Wt 125.9 kg (277 lb 9.6 oz)   SpO2 100%   BMI 37.13 kg/m    Body mass index is 37.13 kg/m .  Physical Exam   GENERAL: alert and no distress  EYES: Eyes grossly normal to inspection, PERRL and conjunctivae and sclerae normal  RESP: lungs clear to auscultation - no rales, rhonchi or wheezes  CV: regular rate and rhythm, normal S1 S2, no S3 or S4, no murmur, click or rub, no peripheral edema  PSYCH: mentation appears normal, affect normal/bright            Signed Electronically by: Tomasz Spargue MD    "

## 2025-04-22 LAB
ANION GAP SERPL CALCULATED.3IONS-SCNC: 11 MMOL/L (ref 7–15)
BUN SERPL-MCNC: 10.9 MG/DL (ref 8–23)
CALCIUM SERPL-MCNC: 9.5 MG/DL (ref 8.8–10.4)
CHLORIDE SERPL-SCNC: 100 MMOL/L (ref 98–107)
CREAT SERPL-MCNC: 0.9 MG/DL (ref 0.67–1.17)
EGFRCR SERPLBLD CKD-EPI 2021: >90 ML/MIN/1.73M2
GLUCOSE SERPL-MCNC: 88 MG/DL (ref 70–99)
HCO3 SERPL-SCNC: 26 MMOL/L (ref 22–29)
POTASSIUM SERPL-SCNC: 4.4 MMOL/L (ref 3.4–5.3)
SODIUM SERPL-SCNC: 137 MMOL/L (ref 135–145)
TSH SERPL DL<=0.005 MIU/L-ACNC: 2.21 UIU/ML (ref 0.3–4.2)

## 2025-04-26 ENCOUNTER — HEALTH MAINTENANCE LETTER (OUTPATIENT)
Age: 60
End: 2025-04-26